# Patient Record
Sex: FEMALE | Race: WHITE | NOT HISPANIC OR LATINO | Employment: FULL TIME | URBAN - METROPOLITAN AREA
[De-identification: names, ages, dates, MRNs, and addresses within clinical notes are randomized per-mention and may not be internally consistent; named-entity substitution may affect disease eponyms.]

---

## 2023-12-12 ENCOUNTER — TELEPHONE (OUTPATIENT)
Dept: ENDOCRINOLOGY | Facility: CLINIC | Age: 33
End: 2023-12-12
Payer: COMMERCIAL

## 2023-12-12 NOTE — TELEPHONE ENCOUNTER
Called patient back. Message was sent to Dr. Martin that this patient would like to proceed with IVF but has VFUV scheduled in February, and to see if there is anything she needs done prior. Patient is aware to call tomorrow to reschedule as an IVF consult as the FUV are only 30 min. Patient was planning diagnostic hysteroscopy over the summer but never called back to schedule.    MEHRDAD SOFIA on 12/12/23 at 4:13 PM.

## 2023-12-12 NOTE — TELEPHONE ENCOUNTER
Patient would like a call back. Took a break following IUI and was told to call back when she's ready to start IVF. I scheduled her for a FUV with Dr. Martin in February but she wanted to know if there's anything she should be doing in the meantime.

## 2023-12-15 ENCOUNTER — TELEPHONE (OUTPATIENT)
Dept: ENDOCRINOLOGY | Facility: CLINIC | Age: 33
End: 2023-12-15
Payer: COMMERCIAL

## 2023-12-15 NOTE — TELEPHONE ENCOUNTER
TC to pt - as pt is not yet scheduled for IVF consult, which is what Dr. Martin recommends. Pt states that she was accidentally scheduled for a virtual FUV instead of IVF consult when she called - pt transferred to [facility] to change the appt.    12/15/23 at 3:32 PM - Jeannette Gandhi RN

## 2024-01-23 NOTE — PROGRESS NOTES
"Patient being seen for annual exam  Patient usually has lower belly pain  Vaginal bleeding after intercourse  Last PAP: 8/17/21 NML HPV -                      Carly Edwards MA    Subjective   Charmayne Chiracu is a 33 y.o. female who is here for a routine exam.  She is sexually active with one male partner and has no concern for STI exposure. She has no pain with sex. She denies bladder or bowel concerns.     Patient reports vaginal odor for the last week. Patient denies any discharge or irritation. Patient reports recently changing laundry detergent.     Patient has the additional concern PCB  as well as light bleeding throughout the month. Patient reports that aub has been occurring for the last two months. Patient also reports that she is under care of [facility] and had had both lab and imaging completed roughly a little over a year ago      Current contraception: none  LMP: 1/9/2024  Last pap: 2021  History of abnormal Pap smear: yes - post leep roughly 6 years ago  Due for pap: yes -   due to hx of LEEP  Family history of uterine or ovarian cancer: no  Family history of prostate cancer: no  Family history of pancreatic cancer: no  Family hx of BRCA1/BRCA2: no  Family history of breast cancer: yes - maternal great grandmother in her 50's  Last mammogram: none  Gardasil: had      OB History    No obstetric history on file.         Review of Systems   Genitourinary:  Positive for menstrual problem.       Objective   /80 (BP Location: Right arm, Patient Position: Sitting, BP Cuff Size: Adult)   Ht 1.6 m (5' 3\")   Wt 54.4 kg (120 lb)   LMP 01/09/2024   BMI 21.26 kg/m²     Physical Exam  HENT:      Mouth/Throat:      Mouth: Mucous membranes are moist.   Eyes:      Conjunctiva/sclera: Conjunctivae normal.   Neck:      Thyroid: No thyroid mass, thyromegaly or thyroid tenderness.   Cardiovascular:      Rate and Rhythm: Normal rate and regular rhythm.      Pulses: Normal pulses.   Pulmonary:      Effort: Pulmonary " effort is normal.      Breath sounds: Normal breath sounds.   Chest:   Breasts:     Breasts are symmetrical.      Right: Normal.      Left: Normal.   Abdominal:      General: Abdomen is flat.      Palpations: Abdomen is soft.      Hernia: There is no hernia in the left inguinal area or right inguinal area.   Genitourinary:     General: Normal vulva.      Vagina: Normal.      Cervix: Friability and eversion present.      Uterus: Normal.       Adnexa: Right adnexa normal and left adnexa normal.      Comments: Pap collected  Musculoskeletal:         General: Normal range of motion.      Cervical back: Normal range of motion.   Lymphadenopathy:      Cervical: No cervical adenopathy.      Right cervical: No superficial, deep or posterior cervical adenopathy.     Left cervical: No superficial, deep or posterior cervical adenopathy.      Lower Body: No right inguinal adenopathy. No left inguinal adenopathy.   Skin:     General: Skin is warm.      Capillary Refill: Capillary refill takes less than 2 seconds.   Neurological:      Mental Status: She is alert and oriented to person, place, and time.   Psychiatric:         Mood and Affect: Mood normal.         Behavior: Behavior normal.          Assessment/Plan   A&P --> 33 y.o. y.o woman for annual GYN exam.     - Health Maintenance -->  - Routine follow up with PCP for health maintenance examination encouraged, including TSH, cholesterol, and Vit. D evaluation.  Self breast awareness encouraged; concerning characteristics of breasts reviewed - Pt. will report general concerns, any adherent lumps, skin dimpling/puckering or color changes, and any nipple discharge.    Diet and exercise reviewed.     Pap will pap today and if wnl next pap will be in 3 years due to hx :- Reviewed ACOG and ASCCP guidelines with pt.        - STD Screening: off pap for gc/ct and trich for PCB     - Contraception Plan: none, seeing infertility, trying to conceive.     -PCB and AUB- pap today, gc/ct  and trich off pap, tsh with reflex today. Will consider pelvic US if other testing is wnl. Ectropion cervix noted today.      - F/U 1 year or as needed.    Kaylie Graf, SANJANA-LENIM

## 2024-01-24 ENCOUNTER — OFFICE VISIT (OUTPATIENT)
Dept: OBSTETRICS AND GYNECOLOGY | Facility: CLINIC | Age: 34
End: 2024-01-24
Payer: COMMERCIAL

## 2024-01-24 VITALS
HEIGHT: 63 IN | WEIGHT: 120 LBS | BODY MASS INDEX: 21.26 KG/M2 | SYSTOLIC BLOOD PRESSURE: 116 MMHG | DIASTOLIC BLOOD PRESSURE: 80 MMHG

## 2024-01-24 DIAGNOSIS — N89.8 VAGINAL ODOR: ICD-10-CM

## 2024-01-24 DIAGNOSIS — Z11.3 SCREENING FOR STDS (SEXUALLY TRANSMITTED DISEASES): ICD-10-CM

## 2024-01-24 DIAGNOSIS — N93.9 ABNORMAL UTERINE BLEEDING (AUB): ICD-10-CM

## 2024-01-24 DIAGNOSIS — Z01.419 WELL WOMAN EXAM: Primary | ICD-10-CM

## 2024-01-24 LAB — TSH SERPL-ACNC: 1.32 MIU/L (ref 0.44–3.98)

## 2024-01-24 PROCEDURE — 36415 COLL VENOUS BLD VENIPUNCTURE: CPT

## 2024-01-24 PROCEDURE — 1036F TOBACCO NON-USER: CPT

## 2024-01-24 PROCEDURE — 87205 SMEAR GRAM STAIN: CPT

## 2024-01-24 PROCEDURE — 88175 CYTOPATH C/V AUTO FLUID REDO: CPT

## 2024-01-24 PROCEDURE — 99213 OFFICE O/P EST LOW 20 MIN: CPT

## 2024-01-24 PROCEDURE — 84443 ASSAY THYROID STIM HORMONE: CPT

## 2024-01-24 PROCEDURE — 87800 DETECT AGNT MULT DNA DIREC: CPT

## 2024-01-24 PROCEDURE — 87661 TRICHOMONAS VAGINALIS AMPLIF: CPT

## 2024-01-24 PROCEDURE — 99395 PREV VISIT EST AGE 18-39: CPT

## 2024-01-24 PROCEDURE — 87624 HPV HI-RISK TYP POOLED RSLT: CPT

## 2024-01-24 RX ORDER — OXCARBAZEPINE 150 MG/1
150 TABLET, FILM COATED ORAL 2 TIMES DAILY
COMMUNITY

## 2024-01-24 RX ORDER — FLUTICASONE PROPIONATE AND SALMETEROL 500; 50 UG/1; UG/1
1 POWDER RESPIRATORY (INHALATION)
COMMUNITY
Start: 2022-01-31

## 2024-01-24 RX ORDER — ALBUTEROL SULFATE 90 UG/1
AEROSOL, METERED RESPIRATORY (INHALATION)
COMMUNITY
Start: 2013-07-10

## 2024-01-24 RX ORDER — DEXTROAMPHETAMINE SACCHARATE, AMPHETAMINE ASPARTATE, DEXTROAMPHETAMINE SULFATE AND AMPHETAMINE SULFATE 5; 5; 5; 5 MG/1; MG/1; MG/1; MG/1
20 TABLET ORAL DAILY
COMMUNITY

## 2024-01-24 RX ORDER — ALBUTEROL SULFATE 0.83 MG/ML
SOLUTION RESPIRATORY (INHALATION)
COMMUNITY

## 2024-01-25 LAB
C TRACH RRNA SPEC QL NAA+PROBE: NEGATIVE
CLUE CELLS VAG LPF-#/AREA: NORMAL /[LPF]
N GONORRHOEA DNA SPEC QL PROBE+SIG AMP: NEGATIVE
NUGENT SCORE: 0
T VAGINALIS RRNA SPEC QL NAA+PROBE: NEGATIVE
YEAST VAG WET PREP-#/AREA: NORMAL

## 2024-02-08 LAB
CYTOLOGY CMNT CVX/VAG CYTO-IMP: NORMAL
HPV HR 12 DNA GENITAL QL NAA+PROBE: NEGATIVE
HPV HR GENOTYPES PNL CVX NAA+PROBE: NEGATIVE
HPV16 DNA SPEC QL NAA+PROBE: NEGATIVE
HPV18 DNA SPEC QL NAA+PROBE: NEGATIVE
LAB AP HPV GENOTYPE QUESTION: YES
LAB AP HPV HR: NORMAL
LAB AP PAP ADDITIONAL TESTS: NORMAL
LABORATORY COMMENT REPORT: NORMAL
LMP START DATE: NORMAL
PATH REPORT.TOTAL CANCER: NORMAL

## 2024-02-22 ENCOUNTER — APPOINTMENT (OUTPATIENT)
Dept: ENDOCRINOLOGY | Facility: CLINIC | Age: 34
End: 2024-02-22
Payer: COMMERCIAL

## 2024-02-22 ENCOUNTER — CONSULT (OUTPATIENT)
Dept: ENDOCRINOLOGY | Facility: CLINIC | Age: 34
End: 2024-02-22
Payer: COMMERCIAL

## 2024-02-22 VITALS
WEIGHT: 123 LBS | SYSTOLIC BLOOD PRESSURE: 118 MMHG | DIASTOLIC BLOOD PRESSURE: 78 MMHG | HEART RATE: 99 BPM | BODY MASS INDEX: 21.79 KG/M2 | TEMPERATURE: 97.5 F | HEIGHT: 63 IN

## 2024-02-22 DIAGNOSIS — N96 RECURRENT PREGNANCY LOSS WITHOUT CURRENT PREGNANCY: Primary | ICD-10-CM

## 2024-02-22 PROCEDURE — 99215 OFFICE O/P EST HI 40 MIN: CPT | Performed by: OBSTETRICS & GYNECOLOGY

## 2024-02-22 PROCEDURE — 99417 PROLNG OP E/M EACH 15 MIN: CPT | Performed by: OBSTETRICS & GYNECOLOGY

## 2024-02-22 RX ORDER — FLUOXETINE HYDROCHLORIDE 40 MG/1
40 CAPSULE ORAL DAILY
COMMUNITY

## 2024-02-22 RX ORDER — DEXTROAMPHETAMINE SACCHARATE, AMPHETAMINE ASPARTATE MONOHYDRATE, DEXTROAMPHETAMINE SULFATE AND AMPHETAMINE SULFATE 5; 5; 5; 5 MG/1; MG/1; MG/1; MG/1
20 CAPSULE, EXTENDED RELEASE ORAL EVERY MORNING
COMMUNITY

## 2024-02-22 ASSESSMENT — PAIN SCALES - GENERAL: PAINLEVEL: 0-NO PAIN

## 2024-02-22 NOTE — PROGRESS NOTES
Virtual Visit    Interval history: Menstruation has gotten worse. Worried about endometriosis. Interested in surgery. Last visit was a year ago.AMH 3.9  in March 2022.   Having issues with asthma, headaches, nausea. They ar ready for IVF>   Past Medical History:   Diagnosis Date    Asthma     Depression with anxiety     Gingiva disorder     History of headache     Migraine, unspecified, not intractable, without status migrainosus     Migraines    Personal history of diseases of the skin and subcutaneous tissue     History of eczema    Personal history of other diseases of the respiratory system     History of asthma    Personal history of other endocrine, nutritional and metabolic disease     History of cystic fibrosis    Personal history of other mental and behavioral disorders     History of anxiety    Seasonal allergies      Past Surgical History:   Procedure Laterality Date    APPENDECTOMY  06/27/2016    Appendectomy    OTHER SURGICAL HISTORY  08/04/2022    Loop electrosurgical excision procedure     Current Outpatient Medications   Medication Instructions    albuterol 2.5 mg /3 mL (0.083 %) nebulizer solution USE 3 ML VIA NEBULIZER 3 TIMES DAILY AS NEEDED FOR WHEEZING/SHORTNESS OF BREATH. INHALE OVER 5 TO 15 MINUTES    albuterol 90 mcg/actuation inhaler INHALE 1 TO 2 PUFFS AS INSTRUCTED EVERY 4 HOURS AS NEEDED FOR WHEEZING/ SHORTNESS OF BREATH    amphetamine-dextroamphetamine (AdderalL) 20 mg tablet 20 mg, oral, Daily    amphetamine-dextroamphetamine XR (Adderall XR) 20 mg 24 hr capsule 20 mg, oral, Every morning, Do not crush or chew.    FLUoxetine (PROZAC) 40 mg, oral, Daily, Patient states she is taking 60mg    fluticasone propion-salmeteroL (Advair Diskus) 500-50 mcg/dose diskus inhaler 1 puff, inhalation    ondansetron HCl (ZOFRAN ORAL) oral    OXcarbazepine (TRILEPTAL) 150 mg, oral, 2 times daily       Past History Reviewed and Affirmed Below:  33yo RPL and infertility      Discussed treatment to date,  pt has completed 3 cycles of ovulation induction with partner IUI; multiple follicles developed each cycle with >5 million TMC at time of IUI.   Discussed treatment options continue ovulation induction with partner IUI vs COH partner IUI vs Invocell vs IVF vs diagnostic laparoscopy. She does have vertility coverage with [employer].   Given suspicion for endometriosis can consider diagnostic laparoscopy, discussed risks/benefits. She is aware there may be no disease found but wants pain relief and a possible diagnosis.      PREOP VISIT AND CONSENT  Reviewed history, labs and imaging. No updates in medical/surgical history.   Discussed planned procedure including risks of blood transfusion, anesthesia complications, injuries including bowel bladder and ureter and possible delayed post op complications requiring additional procedures. Reviewed immediate and delayed post op complications. Reviewed risks of infection and proper incision care. Reviewed time off of work and restrictions.   RBA discussed and consents signed.      Planned procedure: Diagnostic hysteroscopy and laparoscopy, chromopertubation, any indicated procedure     Baylee Martin MD 2023 10:54           Chief Complaint     Patient presents with IVF consult. CGLANTON LPN   PAP 2022 WNL; NO LEANDRA  LMP 4/15/2023;   CGLANTON      History of Present IllnessINT Hx: Here today to review IVF and other options. She has been having very difficult- long, heavy crampy menses. She started with CC 50mg and has changed to letrozole. She has not had luteal issues with progesterone supplementation with IUI. TMC is good. A1c at  was 5.1  She has some concerns about IVF as next step.   She has been told by her genetic counselor that she does not have CF- Sees Dr Ochoa for CF at . Per genetics, no indication for PGTM.      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   Patient of Dr. Martin      Interval history:   Pt has completed 3 cycles of Clomid 50 mg with partner IUI x 1 cycle, without conception   Letrozole 2.5 mg with partner IUI x 2 cycles, without conception      Previous history obtained by Dr. Martin, verified today:   Patient is a 31 year year old  female with a hx CF (rare allele) who presents with RPL and infertility x 3 years. Seen previously at [facility] by Dr Guzman and told it was unexplained. They live in [place]. Results from [facility] reviewed on her phone during consultation. 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   The first day of her menstrual cycle is very heavy with significant dysmenorrhea.   They had been curious about PCOS and was told based on US that she has many \"cysts.\"  She has been timing with OPKs. She tried BBTs. They are concerned they have a LPD as she starts bleeding 2-3 days before her menses. Notes dry skin, very little acne.   She takes digestive enzymes for her CF - BMI is 21.   She has never had a saline US or hysteroscopy, only a " normal HSG at [facility].   She has secondary insurance through [employer].      Previous testing:   AMH: 3.88 (3/21/22)  TSH: WNL multiple times per patient, most recent 1.45 (1/6/22)  FSH: 7.1 (3/14/22)  LH: 3.4 (3/14/22)  PRL: not done  Blood Type: O negative  Testosterone: not done  DHEAS: not done  SA WNL at [facility]  HSG WNL     RELATIONSHIP STATUS:      OB Hx: (year, GA, mode, complications, name) Miscarriage in 9/2021 & 6/2022 (2 total pregnancies), no D&Cs  First SAB- chemical pregnancy, took two years to conceive, 5-6 weeks, started bleeding a few days after her pos home preg test (pos HCG was done while bleeding)  Second SAB- 9 weeks, took them 6 months to conceive, both had food poisoning and towards the end she had bleeding, went to ER and tried to send her tissue to genetics but no results were obtained- gestational sac was seen but no YS or fetal pole, did not require D&C, HCG was 44,000     GYN HISTORY:  History of STI or PID: Yes  Last pap smear: 8/2021 per patient normal  History of abnormal paps: Yes (Leep done 5 years ago)  Mammogram: No  Coitus: x/fertile week   Pelvic pain: With cycles   Pain with intercourse, bowel movements or full bladder: Yes     MENSTRUAL HISTORY:   LMP: Unknown  Menarche: 12  Contraception: Oral b.c., nuvaring, depo  Cycle length: 28-30 days, she notes some spotting or bleeding 2-3 days before onset of menses  Bleeding length: 4 days  Heavy bleeding: Heavy 2 days  Dysmenorrhea: Yes     ENDOCRINE/INFERTILITY HISTORY:  Duration of infertility: 3 yrs  Coital Activity/week: 4-5x  Nipple Discharge: No  Vision changes / headaches: No / Migraines once a week  Excess hair growth: No  Acne/oily skin: Dry skin  Recent weight change: No   Exercise: 2 days a week     PMH: CF (rare variant with one allele), asthma, migraines, anxiety, eczema     MEDICATION: Advair, albuterol, azithromycin, creon, miralax, oxycarbazepine, prenatal tabs, ventolin, VIt D     Allergies: tree nuts, fish/seafood,  levaquin, amoxicillin     PSH: None     PSYCH HISTORY: Anxiety     SOCIAL HISTORY:  Occupation: [employer]   Smoking: No  Alcohol: No  Drug use: No     PARTNER HISTORY:  Name- Calvin Stanford  Age- 31  10/30/1990  Occupation- Sales  Prior fertility history: No  PMH: ADHD  PSH: None  Smoking: No  Alcohol Use: No  Drug Use: No  Medications: Adderall  Injuries /STDs: No  SA: Yes, WNL     FAMILY HISTORY:  Cancer history (breast, ovarian, colon): no  Both of their moms had 1-2 SABs     GENETIC HISTORY:   Ethnic Background:   Patient:    Partner:   Genetic Disease in Family: No  Birth Defects in Family: No  Genetic screening performed previously: Yes, has rare allele for CF and he has not been tested for this. She and he both had Sema4. She was positive for 4 conditions, none of which are the same as his 2 positive conditions (he will get us the copy of his results). HOWEVER, he has not been tested for her allele. They have had a call with the Sema4 counselors in the past.       ASSESSMENT   33 y.o.  female with  secondary infertility x 2 years, RPL, and the following pertinent medical issues: possible endometriosis.  Indication for IVF: Recurrent Pregnancy Loss  Partner SA: Normal    We reviewed IVF and discussed the following:   In-vitro fertilization and embryo transfer  Stimulation protocols   Oocyte retrieval, risks    Cryopreservation   Assessment of fertilization   Embryo development  Statistics  ICSI/Assisted hatching   Embryo transfer and preparation    Risks of OHSS and multiple gestation   Cancelled cycles   Use of birth control   Selective reduction   Number of embryos to transfer   Ectopic pregnancy  and miscarriage  Team based care  Informed consent procedures  Folic acid supplementation   Genetic carrier screening   PGT  Frozen tissue storage and transport process  Discussed that PAP and mammogram must be updated if appropriate based on age and clinical  history and  results received before treatment can begin.    ART Cycle Plan    1. Protocol:  Lead in: OCP  Stimulation protocol: Antagonist with  and menopur 75  Trigger plan: HCG vs Lupron  Pre-retrieval meds: Antibiotics per protocol  Adjuncts: NONE  Notes:  Considering laparoscopy for poss endo between egg retrieval and embryo transfer    2. FET:  Protocol: Programmed  Adjuncts: NONE  Notes:     3. Insemination:  Sperm source: partner  Sperm collection method: Fresh with Frozen Backup  Notes:  ICSI: Yes  # of oocytes to be fertilized: all    4. Transfer:   Number of embryos to replace: 1 PGTA WNL  Stage of embryo transfer: day 5  Trial transfer needed? No    5. Cryopreservation plan  PGT: PGTA   Freeze all? Yes  Oocyte cryopreservation: NA    6. Patient willing to accept blood transfusion: Yes    7. RN to review chart, initiate IVF boarding pass, and assure completion of the following prior to proceeding with IVF stimulation:       No orders of the defined types were placed in this encounter.      STDs (Hepatitis B, Hepatitis C, HIV, Syphilis, GC/CT) for patient and partner (if applicable) to be completed within the last year (z11.3)  Genetic carrier testing: waiver or carrier screen completed with clearance documentation by provider for both patient and partner (z13.71)  Rubella and varicella to be completed within the last five years (z11.59)   TSH to be completed within the last year (z13.29)  Type & Screen to be completed within the last year (z01.83)  AMH to be completed within the last year (z31.41)  Pre-IVF Imaging: Reference any orders placed by provider.  Cavity evaluation: hysteroscopy  Frozen sperm sample: ensure frozen partner sample (z31.41) or verify donor sperm on site prior to stimulation start date.  Verify in EMR or obtain copy of patient’s last mammogram (if applicable) and pap smear results for provider review in boarding pass.  Enroll in Engaged MD and complete annual consent forms for IVF and  cryotransport agreements.  BMI checklist for BMI <18 or >40  Consults: Nursing and Financial Consult.  PAT Consult: No  Ectopic Risk: No  Medically Complex: No  Additional consults NONE and review what is in the boarding pass.    Baylee Martin  02/22/2024  7:23 PM

## 2024-04-05 DIAGNOSIS — Z01.812 ENCOUNTER FOR PREPROCEDURAL LABORATORY EXAMINATION: ICD-10-CM

## 2024-04-05 DIAGNOSIS — Z31.41 FERTILITY TESTING: ICD-10-CM

## 2024-05-16 ENCOUNTER — APPOINTMENT (OUTPATIENT)
Dept: ENDOCRINOLOGY | Facility: CLINIC | Age: 34
End: 2024-05-16
Payer: COMMERCIAL

## 2024-05-16 ENCOUNTER — PREP FOR PROCEDURE (OUTPATIENT)
Dept: ENDOCRINOLOGY | Facility: CLINIC | Age: 34
End: 2024-05-16
Payer: COMMERCIAL

## 2024-05-16 RX ORDER — ACETAMINOPHEN 325 MG/1
650 TABLET ORAL ONCE AS NEEDED
OUTPATIENT
Start: 2024-05-16

## 2024-05-16 RX ORDER — KETOROLAC TROMETHAMINE 30 MG/ML
30 INJECTION, SOLUTION INTRAMUSCULAR; INTRAVENOUS ONCE AS NEEDED
OUTPATIENT
Start: 2024-05-16 | End: 2024-05-21

## 2024-05-23 ENCOUNTER — OFFICE VISIT (OUTPATIENT)
Dept: DERMATOLOGY | Facility: CLINIC | Age: 34
End: 2024-05-23
Payer: COMMERCIAL

## 2024-05-23 DIAGNOSIS — L20.9 ATOPIC DERMATITIS, UNSPECIFIED TYPE: Primary | ICD-10-CM

## 2024-05-23 PROCEDURE — 99204 OFFICE O/P NEW MOD 45 MIN: CPT | Performed by: NURSE PRACTITIONER

## 2024-05-23 PROCEDURE — 1036F TOBACCO NON-USER: CPT | Performed by: NURSE PRACTITIONER

## 2024-05-23 NOTE — PROGRESS NOTES
Subjective     Charmayne Chiracu is a 33 y.o. female who presents for the following: Rash and Eczema.   New patient in for eczema- all over including hands longstanding - eucerin lotion, amlactin.   Has tried: dupixent (stop due to needle phobia), unknown topical steroids. Eucrisa (not effective).     Rash - present x 3 weeks- denies itch. Patient states that the rash is red in color and is located all over.     Review of Systems:  No other skin or systemic complaints other than what is documented elsewhere in the note.    The following portions of the chart were reviewed this encounter and updated as appropriate:       Skin Cancer History  No skin cancer on file.    Specialty Problems    None    Past Medical History:  Charmayne Chiracu  has a past medical history of Asthma ([facility]), Depression with anxiety, Gingiva disorder, History of headache, Migraine, unspecified, not intractable, without status migrainosus, Personal history of diseases of the skin and subcutaneous tissue, Personal history of other diseases of the respiratory system, Personal history of other endocrine, nutritional and metabolic disease, Personal history of other mental and behavioral disorders, and Seasonal allergies.    Past Surgical History:  Charmayne Chiracu  has a past surgical history that includes Appendectomy (06/27/2016) and Other surgical history (08/04/2022).    Family History:  Patient family history is not on file.    Social History:  Charmayne Chiracu  reports that she has never smoked. She has never been exposed to tobacco smoke. She has never used smokeless tobacco. She reports current alcohol use. She reports that she does not use drugs.    Allergies:  Shellfish containing products, Tree nuts, Latex, Levofloxacin, and Amoxicillin    Current Medications / CAM's:    Current Outpatient Medications:     albuterol 2.5 mg /3 mL (0.083 %) nebulizer solution, USE 3 ML VIA NEBULIZER 3 TIMES DAILY AS NEEDED FOR WHEEZING/SHORTNESS OF  BREATH. INHALE OVER 5 TO 15 MINUTES, Disp: , Rfl:     albuterol 90 mcg/actuation inhaler, INHALE 1 TO 2 PUFFS AS INSTRUCTED EVERY 4 HOURS AS NEEDED FOR WHEEZING/ SHORTNESS OF BREATH, Disp: , Rfl:     amphetamine-dextroamphetamine (AdderalL) 20 mg tablet, Take 1 tablet (20 mg) by mouth once daily., Disp: , Rfl:     amphetamine-dextroamphetamine XR (Adderall XR) 20 mg 24 hr capsule, Take 1 capsule (20 mg) by mouth once daily in the morning. Do not crush or chew., Disp: , Rfl:     FLUoxetine (PROzac) 40 mg capsule, Take 1 capsule (40 mg) by mouth once daily. Patient states she is taking 60mg, Disp: , Rfl:     fluticasone propion-salmeteroL (Advair Diskus) 500-50 mcg/dose diskus inhaler, Inhale 1 puff., Disp: , Rfl:     ondansetron HCl (ZOFRAN ORAL), Take by mouth., Disp: , Rfl:     OXcarbazepine (Trileptal) 150 mg tablet, Take 1 tablet (150 mg) by mouth 2 times a day., Disp: , Rfl:     ruxolitinib (Opzelura) 1.5 % cream cream, Apply 1 Application topically 2 times a day., Disp: 60 g, Rfl: 3     Objective   Well appearing patient in no apparent distress; mood and affect are within normal limits.      Assessment/Plan   1. Atopic dermatitis, unspecified type  Erythematous scaly papules and plaques with overyling excoriation located symmetrically in the antecubital and popliteal fossae.  She has scattered small pink scaly macules and papules scattered on her body throughout.  Patient has attempted to use multiple topical steroids, oral antihistamines oral corticosteroids and Dupixent in the past.  Dupixent was discontinued because patient had a difficult time administering the injection herself while she did find it somewhat helpful.  Patient uses gentle cleansers and daily moisturizers and is aware to avoid excessive fragrance    Plan: Counseling.  I counseled the patient regarding the following:  Skin care: Patient should bathe using lukewarm water with a mild cleanser and moisturize immediately after. Emollients should  be applied at least  2-3 times daily. Avoid scented detergents or fabric softeners. Keep fingernai ls short. Avoid excessive hand washing.  Expectations : The patient is aware that eczema is chronic in nature and can improve with moisturizers and topical steroids and worsen with stress,  scented soaps, detergents, scratching, dry skin, changes in weather and skin infections.  Contact office if:  Eczema worsens or fails to improve despite several weeks of treatment; patient develops skin infections (such as:yellow honey  colored crusts or cold sores).    PLAN:  Start Opzelura 1.5% cream twice daily for 8 weeks and reevaluate  We discussed the use of oral Aayush inhibitors as the potential  And treatment as patient states the wintertime unbearable however she is beginning IVF therapy for infertility in the fall 2024.  Will plan to avoid systemic treatment if possible in the meantime    ruxolitinib (Opzelura) 1.5 % cream cream  Apply 1 Application topically 2 times a day.

## 2024-05-24 ENCOUNTER — SPECIALTY PHARMACY (OUTPATIENT)
Dept: PHARMACY | Facility: CLINIC | Age: 34
End: 2024-05-24

## 2024-06-07 DIAGNOSIS — L20.9 ATOPIC DERMATITIS, UNSPECIFIED TYPE: ICD-10-CM

## 2024-06-17 ENCOUNTER — HOSPITAL ENCOUNTER (OUTPATIENT)
Dept: ENDOCRINOLOGY | Facility: CLINIC | Age: 34
Discharge: HOME | End: 2024-06-17
Payer: COMMERCIAL

## 2024-06-17 ENCOUNTER — PREP FOR PROCEDURE (OUTPATIENT)
Dept: ENDOCRINOLOGY | Facility: CLINIC | Age: 34
End: 2024-06-17

## 2024-06-17 VITALS
TEMPERATURE: 97.7 F | OXYGEN SATURATION: 97 % | HEART RATE: 81 BPM | SYSTOLIC BLOOD PRESSURE: 113 MMHG | DIASTOLIC BLOOD PRESSURE: 74 MMHG | RESPIRATION RATE: 18 BRPM

## 2024-06-17 DIAGNOSIS — Z11.59 ENCOUNTER FOR SCREENING FOR OTHER VIRAL DISEASES: ICD-10-CM

## 2024-06-17 DIAGNOSIS — Z01.83 ENCOUNTER FOR RH BLOOD TYPING: ICD-10-CM

## 2024-06-17 DIAGNOSIS — Z11.3 SCREENING FOR STDS (SEXUALLY TRANSMITTED DISEASES): ICD-10-CM

## 2024-06-17 DIAGNOSIS — Z31.41 FERTILITY TESTING: ICD-10-CM

## 2024-06-17 LAB
ABO GROUP (TYPE) IN BLOOD: NORMAL
ANTIBODY SCREEN: NORMAL
PREGNANCY TEST URINE, POC: NEGATIVE
RH FACTOR (ANTIGEN D): NORMAL

## 2024-06-17 PROCEDURE — 86850 RBC ANTIBODY SCREEN: CPT

## 2024-06-17 PROCEDURE — 87389 HIV-1 AG W/HIV-1&-2 AB AG IA: CPT

## 2024-06-17 PROCEDURE — 7100000009 HC PHASE TWO TIME - INITIAL BASE CHARGE

## 2024-06-17 PROCEDURE — 58555 HYSTEROSCOPY DX SEP PROC: CPT | Mod: GC | Performed by: OBSTETRICS & GYNECOLOGY

## 2024-06-17 PROCEDURE — 86900 BLOOD TYPING SEROLOGIC ABO: CPT

## 2024-06-17 PROCEDURE — 87340 HEPATITIS B SURFACE AG IA: CPT

## 2024-06-17 PROCEDURE — 58555 HYSTEROSCOPY DX SEP PROC: CPT | Performed by: OBSTETRICS & GYNECOLOGY

## 2024-06-17 PROCEDURE — 83516 IMMUNOASSAY NONANTIBODY: CPT

## 2024-06-17 PROCEDURE — 64435 NJX AA&/STRD PARACRV NRV: CPT | Mod: GC | Performed by: OBSTETRICS & GYNECOLOGY

## 2024-06-17 PROCEDURE — 86317 IMMUNOASSAY INFECTIOUS AGENT: CPT

## 2024-06-17 PROCEDURE — 86803 HEPATITIS C AB TEST: CPT

## 2024-06-17 PROCEDURE — 36415 COLL VENOUS BLD VENIPUNCTURE: CPT

## 2024-06-17 PROCEDURE — 86780 TREPONEMA PALLIDUM: CPT

## 2024-06-17 PROCEDURE — 86901 BLOOD TYPING SEROLOGIC RH(D): CPT

## 2024-06-17 PROCEDURE — 81025 URINE PREGNANCY TEST: CPT | Performed by: STUDENT IN AN ORGANIZED HEALTH CARE EDUCATION/TRAINING PROGRAM

## 2024-06-17 RX ORDER — KETOROLAC TROMETHAMINE 30 MG/ML
30 INJECTION, SOLUTION INTRAMUSCULAR; INTRAVENOUS ONCE AS NEEDED
Status: DISCONTINUED | OUTPATIENT
Start: 2024-06-17 | End: 2024-06-18 | Stop reason: HOSPADM

## 2024-06-17 RX ORDER — ACETAMINOPHEN 325 MG/1
650 TABLET ORAL ONCE AS NEEDED
Status: DISCONTINUED | OUTPATIENT
Start: 2024-06-17 | End: 2024-06-18 | Stop reason: HOSPADM

## 2024-06-17 ASSESSMENT — PAIN - FUNCTIONAL ASSESSMENT
PAIN_FUNCTIONAL_ASSESSMENT: 0-10
PAIN_FUNCTIONAL_ASSESSMENT: 0-10

## 2024-06-17 ASSESSMENT — PAIN SCALES - GENERAL
PAINLEVEL_OUTOF10: 0 - NO PAIN
PAINLEVEL_OUTOF10: 0 - NO PAIN

## 2024-06-17 NOTE — PROGRESS NOTES
Patient ID: Charmayne Chiracu is a 33 y.o. female.    Hysteroscopy diagnostic    Date/Time: 6/17/2024 2:14 PM    Performed by: Shelby Jean-Baptiste MD  Authorized by: Baylee Martin MD    Consent:     Consent obtained:  Verbal and written    Consent given by:  Patient    Risks, benefits, and alternatives were discussed: yes      Risks discussed:  Bleeding, infection and pain  Universal protocol:     Procedure explained and questions answered to patient or proxy's satisfaction: yes      Relevant documents present and verified: yes      Test results available: yes      Imaging studies available: yes      Required blood products, implants, devices, and special equipment available: yes      Immediately prior to procedure, a time out was called: yes      Patient identity confirmed:  Verbally with patient, arm band and hospital-assigned identification number  Pre-procedure details:     Skin preparation:  Povidone-iodine  Procedure specific details:      Procedure: Diagnostic Hysteroscopy   Preop diagnosis: fertility testing  Post op diagnosis: Same   Assistant: MD Jerilyn    Anesthesia: None   IV: None   EBL: 3 cc  Specimens: None   Complications: None   Risks benefits and alternatives of the procedure explained to the patient and informed consent was obtained. Urine pregnancy test was performed and was negative. Time out was performed. The patient was placed in the dorsal lithotomy position and a sterile speculum was placed in the vagina. The cervix was sterilized with Betadine x3. Paracervical block with lidocaine 1% was administered.   Tenaculum: Yes  Dilation: No  The hysteroscope was placed in the cervix and advanced into the uterine cavity. Normal saline was used for distension media. Images were obtained and findings noted as below.   All instruments were then removed. Good hemostasis was noted. Patient tolerated the procedure well returned to the recovery area in stable condition.   Findings:   Cavity: Smooth cavity  no lesions noted  Ostia: Bilateral tubal ostia visualized   Additional Notes:    Attending Attestation: I was physically present for key and critical portions performed by the fellow. I reviewed the fellow's documentation and discussed the patient with the fellow. I agree with the fellow's medical decision making as documented in the fellow's note.   Shelby Jean-Baptiste 06/17/24 2:14 PM                Post-procedure details:     Procedure completion:  Tolerated well, no immediate complications      Baylee Martin 06/18/24 10:55 AM

## 2024-06-18 DIAGNOSIS — L20.9 ATOPIC DERMATITIS, UNSPECIFIED TYPE: Primary | ICD-10-CM

## 2024-06-18 LAB
HBV SURFACE AG SERPL QL IA: NONREACTIVE
HCV AB SER QL: NONREACTIVE
HIV 1+2 AB+HIV1 P24 AG SERPL QL IA: NONREACTIVE
RUBV IGG SERPL IA-ACNC: 1.4 IA
RUBV IGG SERPL QL IA: POSITIVE
TREPONEMA PALLIDUM IGG+IGM AB [PRESENCE] IN SERUM OR PLASMA BY IMMUNOASSAY: NONREACTIVE

## 2024-06-18 RX ORDER — PIMECROLIMUS 10 MG/G
CREAM TOPICAL 2 TIMES DAILY
Qty: 60 G | Refills: 2 | Status: SHIPPED | OUTPATIENT
Start: 2024-06-18 | End: 2025-06-18

## 2024-06-19 LAB — MIS SERPL-MCNC: 5.77 NG/ML (ref 0.18–11.71)

## 2024-07-31 ENCOUNTER — APPOINTMENT (OUTPATIENT)
Dept: DERMATOLOGY | Facility: CLINIC | Age: 34
End: 2024-07-31
Payer: COMMERCIAL

## 2024-07-31 DIAGNOSIS — B35.1 ONYCHOMYCOSIS: ICD-10-CM

## 2024-07-31 DIAGNOSIS — L20.9 ATOPIC DERMATITIS, UNSPECIFIED TYPE: Primary | ICD-10-CM

## 2024-07-31 PROCEDURE — 99214 OFFICE O/P EST MOD 30 MIN: CPT | Performed by: NURSE PRACTITIONER

## 2024-07-31 RX ORDER — LISDEXAMFETAMINE DIMESYLATE 40 MG/1
1 CAPSULE ORAL
COMMUNITY

## 2024-07-31 RX ORDER — TERBINAFINE HYDROCHLORIDE 250 MG/1
TABLET ORAL
Qty: 14 TABLET | Refills: 2 | Status: SHIPPED | OUTPATIENT
Start: 2024-07-31

## 2024-07-31 NOTE — PROGRESS NOTES
Subjective     Charmayne Chiracu is a 33 y.o. female who presents for the following: Eczema (Located hands and behind knees/+ Seasonal allergies/Using Eucerin Lotion/Insurance kept denying Opzelura and has not received this yet).     Review of Systems:  No other skin or systemic complaints other than what is documented elsewhere in the note.    The following portions of the chart were reviewed this encounter and updated as appropriate:          Skin Cancer History  No skin cancer on file.      Specialty Problems    None       Objective   Well appearing patient in no apparent distress; mood and affect are within normal limits.    A focused skin examination was performed. All findings within normal limits unless otherwise noted below.    Assessment/Plan   1. Atopic dermatitis, unspecified type  Erythematous scaly papules and plaques with overyling excoriation located symmetrically in the antecubital and popliteal fossae.  She has scattered small pink scaly macules and papules scattered on her body throughout with worsening to bilateral hands.  Patient has attempted to use multiple topical steroids, oral antihistamines oral corticosteroids in the past.  Dupixent attempted in the past but was discontinued because patient had a difficult time administering the injection herself while she did find it somewhat helpful.  Patient uses gentle cleansers and daily moisturizers and is aware to avoid excessive fragrance. On exam today, her BSA estimated at 20% given the scattered plaques.      Plan: Counseling.  I counseled the patient regarding the following:  Skin care: Patient should bathe using lukewarm water with a mild cleanser and moisturize immediately after. Emollients should be applied at least  2-3 times daily. Avoid scented detergents or fabric softeners. Keep fingernai ls short. Avoid excessive hand washing.  Expectations : The patient is aware that eczema is chronic in nature and can improve with moisturizers and  topical steroids and worsen with stress,  scented soaps, detergents, scratching, dry skin, changes in weather and skin infections.  Contact office if:  Eczema worsens or fails to improve despite several weeks of treatment; patient develops skin infections (such as:yellow honey  colored crusts or cold sores).    PLAN:  Opzelura 1.5% cream twice daily was not covered.   Patient states that this is extremely impactful to her daily life and mental estefani and needs relief as she has had atopic dermatitis since 1 years old.    She is planning on a family in the future and is in discussion for possible IVF, however she states she simply can't function with her hands itching or scaling any longer and wants to proceed with systemic treatment given failure of oral prednisone, topical steroids (multiple), and topical elidel.      dupilumab (Dupixent) 300 mg/2 mL pen injector  Inject 4 mL (600 mg) under the skin 1 time for 1 dose.    dupilumab (Dupixent) 300 mg/2 mL pen injector  Inject 2 mL (300 mg) under the skin every 14 (fourteen) days.    Related Medications  pimecrolimus (Elidel) 1 % cream  Apply topically 2 times a day.    2. Onychomycosis (2)  Left 4th Proximal Nail fold of Toe; Left 5th Proximal Nail fold of Toe    Related Medications  terbinafine (LamISIL) 250 mg tablet  Take two tablets daily for 7 days a month for three months

## 2024-08-20 DIAGNOSIS — L20.9 ATOPIC DERMATITIS, UNSPECIFIED TYPE: ICD-10-CM

## 2024-08-20 RX ORDER — DUPILUMAB 300 MG/2ML
INJECTION, SOLUTION SUBCUTANEOUS
Qty: 4 ML | Refills: 0 | Status: SHIPPED | OUTPATIENT
Start: 2024-08-20

## 2024-08-20 NOTE — PROGRESS NOTES
Dupixent prior authorization approved until 2/15/2025. Patient must fill with [facility].     Clinical pharmacist routed Dupixent prescriptions to [facility] per insurance mandate.

## 2024-09-03 ENCOUNTER — APPOINTMENT (OUTPATIENT)
Dept: ALLERGY | Facility: CLINIC | Age: 34
End: 2024-09-03
Payer: COMMERCIAL

## 2024-09-03 VITALS
SYSTOLIC BLOOD PRESSURE: 112 MMHG | TEMPERATURE: 98.3 F | RESPIRATION RATE: 17 BRPM | WEIGHT: 123 LBS | DIASTOLIC BLOOD PRESSURE: 70 MMHG | BODY MASS INDEX: 21.79 KG/M2 | OXYGEN SATURATION: 99 % | HEIGHT: 63 IN | HEART RATE: 90 BPM

## 2024-09-03 DIAGNOSIS — J30.1 SEASONAL ALLERGIC RHINITIS DUE TO POLLEN: ICD-10-CM

## 2024-09-03 DIAGNOSIS — B99.9 RECURRENT INFECTIONS: ICD-10-CM

## 2024-09-03 DIAGNOSIS — R23.8 HYPERLINEAR PALMS: ICD-10-CM

## 2024-09-03 DIAGNOSIS — L74.4 HYPOHIDROSIS: Primary | ICD-10-CM

## 2024-09-03 DIAGNOSIS — J45.52 SEVERE PERSISTENT ASTHMA WITH STATUS ASTHMATICUS (MULTI): ICD-10-CM

## 2024-09-03 DIAGNOSIS — J45.909 ASTHMA, UNSPECIFIED ASTHMA SEVERITY, UNSPECIFIED WHETHER COMPLICATED, UNSPECIFIED WHETHER PERSISTENT (HHS-HCC): ICD-10-CM

## 2024-09-03 DIAGNOSIS — E88.01 HETEROZYGOUS ALPHA 1-ANTITRYPSIN DEFICIENCY (MULTI): ICD-10-CM

## 2024-09-03 DIAGNOSIS — L20.9 ATOPIC DERMATITIS, UNSPECIFIED TYPE: ICD-10-CM

## 2024-09-03 RX ORDER — LEVOCETIRIZINE DIHYDROCHLORIDE 5 MG/1
TABLET, FILM COATED ORAL EVERY EVENING
COMMUNITY

## 2024-09-03 RX ORDER — TRIAMCINOLONE ACETONIDE 1 MG/G
CREAM TOPICAL
COMMUNITY

## 2024-09-03 RX ORDER — ALBUTEROL SULFATE 0.83 MG/ML
2.5 SOLUTION RESPIRATORY (INHALATION) ONCE
Status: COMPLETED | OUTPATIENT
Start: 2024-09-03 | End: 2024-09-03

## 2024-09-03 NOTE — PROGRESS NOTES
Patient ID: Charmayne Chiracu is a 33 y.o. female.     Chief Complaint: NPV self referred. Dr. [place] in [place].  History Of Present Illness  Charmayne Chiracu is a 33 y.o. female with Pmx atopy presenting for consultation.     Sees [facility] in derm.    Food Allergy  Seafood, fish, peaches and tree nuts.    Tree nut and shellfish is anaphylaxis  Peaches-not sure  Fish-itching and tingling  Coconut-rash and mouth feels irritated    Eczema/ Atopic Dermatitis  Atopic dermatitis since child core[place].  Just started loading dose 1/2 wks ago  Previously failed topical steroids, oral prednisone (2 times a year), Opzelura, Elidel.  Patient did try the Dupixent syringe a couple years ago, but did not want to do the syringe. Now that it is the pen, she is ok with doing.    Asthma    Age at diagnosis: 1 yo  Did allergy shots and Xolair as a child  Current medication: Advair bid and albuterol as needed. Has tried Singulair and it failed.  Hospitalizations/ER visits in the last year: Urgent care visit 4-5 months ago for hives.No er for breathing issues  Oral steroid/systemic steroids in the last year: 2 oral steroids in the last year for difficulty breathing  Triggers: change of seasons. Rigorous activity, illness  Smoker or Smoke exposure: never smoked. Mom smoked outside when she was a kid.  Family history:  Both parents have allergy, dad has asthma/copd.  Her father and paternal side of family with bad eczema.    Patient was previously told she had a rare variant of CF, but when recently retested by genetics, she did not have the gene.  She reports she does not sweat normally so her sweat tests were insufficient.  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  She had a lot of dental work as a child and was told enamel issue due to recurrent need for oral steroids.  Has not had issues with her hair.  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  Takes Xyzal at bedtime.  For Congestion she uses Allegra D in the morning.  No nasal sprays at this time. Nasal polyps are bad and the sprays did not help. Has not tried Xhance.  [facility]     Drug Allergy   No allergy to ASA    Insect Allergy   No    Infections  No history of frequent or recurrent infections      Review of Systems    Pertinent positives and negatives have been assessed in the HPI. All other systems have been reviewed and are negative except as noted in the HPI.    Allergies  Shellfish containing products, Tree nuts, Latex, Levofloxacin, and Amoxicillin    Past Medical History  She has a past medical history of Asthma ([facility]), Depression with anxiety, Gingiva disorder, History of headache, Migraine, unspecified, not intractable, without status migrainosus, Personal history of diseases of the skin and subcutaneous tissue, Personal history of other diseases of the respiratory system, Personal history of other endocrine, nutritional and metabolic disease, Personal history of other mental and behavioral disorders, and Seasonal allergies.  Patient has been undergoing fertility treatments in effort to become pregnant.  Family History  As noted in history above    Surgical History  She has a past surgical history that includes Appendectomy (06/27/2016) and Other surgical history (08/04/2022).    Social/Environmental History  She reports that she has never smoked. She has never been exposed to tobacco smoke. She has never used smokeless tobacco. She reports current alcohol use. She reports that she does not use drugs.    Home: Lives in a house   Floors: Wood, some new carpet  Air Conditioning: Central  Smoker: No  Pets: 2 alexis doodles  Infestations: No  Molds: No  Occupation: works in sales for [employer].    MEDICATIONS  Current Outpatient Medications on File Prior  "to Visit   Medication Sig Dispense Refill    albuterol 2.5 mg /3 mL (0.083 %) nebulizer solution USE 3 ML VIA NEBULIZER 3 TIMES DAILY AS NEEDED FOR WHEEZING/SHORTNESS OF BREATH. INHALE OVER 5 TO 15 MINUTES      albuterol 90 mcg/actuation inhaler INHALE 1 TO 2 PUFFS AS INSTRUCTED EVERY 4 HOURS AS NEEDED FOR WHEEZING/ SHORTNESS OF BREATH      amphetamine-dextroamphetamine XR (Adderall XR) 20 mg 24 hr capsule Take 1 capsule (20 mg) by mouth once daily in the morning. Do not crush or chew.      dupilumab (Dupixent Pen) 300 mg/2 mL pen injector Inject 600mg on day 1 for loading dose 4 mL 0    dupilumab (Dupixent) 300 mg/2 mL pen injector Inject 2 mL (300 mg) under the skin every 14 (fourteen) days. 4 mL 3    FLUoxetine (PROzac) 40 mg capsule Take 1 capsule (40 mg) by mouth once daily. Patient states she is taking 60mg      fluticasone propion-salmeteroL (Advair Diskus) 500-50 mcg/dose diskus inhaler Inhale 1 puff.      lisdexamfetamine (Vyvanse) 40 mg capsule Take 1 capsule (40 mg) by mouth once daily in the morning. Take before meals.      ondansetron HCl (ZOFRAN ORAL) Take by mouth.      OXcarbazepine (Trileptal) 150 mg tablet Take 1 tablet (150 mg) by mouth 2 times a day.      pimecrolimus (Elidel) 1 % cream Apply topically 2 times a day. 60 g 2    terbinafine (LamISIL) 250 mg tablet Take two tablets daily for 7 days a month for three months 14 tablet 2    triamcinolone (Kenalog) 0.1 % cream TAKE 1 OSWALDO APPLIED TOPICALLY 2 TIMES A DAY APPLY TO AFFECTED AREA TWICE DAILY       No current facility-administered medications on file prior to visit.         Physical Exam  Visit Vitals  /70   Pulse 90   Temp 36.8 °C (98.3 °F) (Temporal)   Resp 17   Ht 1.6 m (5' 3\")   Wt 55.8 kg (123 lb)   SpO2 99%   BMI 21.79 kg/m²   OB Status Having periods   Smoking Status Never   BSA 1.57 m²       Wt Readings from Last 1 Encounters:   09/03/24 55.8 kg (123 lb)       Physical Exam    General: Well appearing, no acute distress  Head: " Normocephalic, atraumatic, neck supple without lymphadenopathy  Eyes: EOMI, non-injected  Nose: No nasal crease, nares patent, slightly boggy turbinates, minimal discharge  Throat: Normal dentition, no erythema  Heart: Regular rate and rhythm  Lungs: Clear to auscultation bilaterally, effort normal  Abdomen: Soft, non-tender, normal bowel sounds. No hsm  Extremities: Moves all extremities symmetrically, no edema  Skin: Fair skin/dry. Hyperlinearity of the palms. Neck with eczematous patches, forearms and hands with erythematous eczematous patches.  Antecubes bilaterally with erythema and dry eczematous patches. No open scratches or oozing is present. Lesions on the legs have recently cleared per patient.  Psych: normal mood and affect    LAB RESULTS:  CBC:  Recent Labs     06/13/22  1326   WBC 8.8   HGB 14.9   HCT 43.3      MCV 93   EOSABS 0.09       CMP:  Recent Labs     06/13/22  1326   *   K 3.2*      CO2 25   ANIONGAP 11   BUN 18   CREATININE 0.85     Recent Labs     06/13/22  1326   EOSABS 0.09       ABO:   Recent Labs     06/17/24  1359   ABO O       HEME/ENDO:  Recent Labs     01/24/24  1448 08/29/22  1007 01/06/22  1612   TSH 1.32 1.08  --    HGBA1C  --   --  5.1     Pre/post albuterol spirometry is performed. She is on daily Advair maintenance therapy and did take this morning.  Assessment/Plan   Charmayne is a 32 yo woman with history of multiple atopic conditions including rhinitis, food allergy, atopic dermatitis and severe asthma. She has normal lung function on therapy today. She reports a history of recurrent infections, hypohidrosis and recurrent infections. She is a heterozygote for Alpha 1 antitrypsin.  She has struggled with infertility issues. There is a family history of copd on her father's side of the family.  Patient and I discussed history as concerning for a more unifying diagnosis such as and ectodermal dysplasia variant.  I will refer to genetics for additional  input.  She will continue with Dupixent as she has had only one loading dose and wishes to improve her skin.  She reports a possible side effect of fatigue, but will continue to monitor.  I will obtain immune and allergy labs. I will defer allergy skin tests as I do not want her to be itching and to flare her skin as she attempts to gain control on biologic therapy.  Will plan follow up in a month to review labs and plan of care.    Nalini Pierce, DO

## 2024-10-08 ENCOUNTER — APPOINTMENT (OUTPATIENT)
Dept: ALLERGY | Facility: CLINIC | Age: 34
End: 2024-10-08
Payer: COMMERCIAL

## 2024-11-01 ENCOUNTER — LAB (OUTPATIENT)
Dept: LAB | Facility: LAB | Age: 34
End: 2024-11-01
Payer: COMMERCIAL

## 2024-11-01 DIAGNOSIS — B99.9 RECURRENT INFECTIONS: ICD-10-CM

## 2024-11-01 DIAGNOSIS — J30.1 SEASONAL ALLERGIC RHINITIS DUE TO POLLEN: ICD-10-CM

## 2024-11-01 DIAGNOSIS — J45.52 SEVERE PERSISTENT ASTHMA WITH STATUS ASTHMATICUS (MULTI): ICD-10-CM

## 2024-11-01 LAB
ALBUMIN SERPL BCP-MCNC: 4.4 G/DL (ref 3.4–5)
ALP SERPL-CCNC: 63 U/L (ref 33–110)
ALT SERPL W P-5'-P-CCNC: 11 U/L (ref 7–45)
ANION GAP SERPL CALC-SCNC: 8 MMOL/L (ref 10–20)
AST SERPL W P-5'-P-CCNC: 18 U/L (ref 9–39)
BASOPHILS # BLD AUTO: 0.02 X10*3/UL (ref 0–0.1)
BASOPHILS NFR BLD AUTO: 0.2 %
BILIRUB SERPL-MCNC: 0.4 MG/DL (ref 0–1.2)
BUN SERPL-MCNC: 15 MG/DL (ref 6–23)
CALCIUM SERPL-MCNC: 9.2 MG/DL (ref 8.6–10.3)
CHLORIDE SERPL-SCNC: 103 MMOL/L (ref 98–107)
CO2 SERPL-SCNC: 30 MMOL/L (ref 21–32)
CREAT SERPL-MCNC: 0.67 MG/DL (ref 0.5–1.05)
EGFRCR SERPLBLD CKD-EPI 2021: >90 ML/MIN/1.73M*2
EOSINOPHIL # BLD AUTO: 1.68 X10*3/UL (ref 0–0.7)
EOSINOPHIL NFR BLD AUTO: 19.4 %
ERYTHROCYTE [DISTWIDTH] IN BLOOD BY AUTOMATED COUNT: 11.9 % (ref 11.5–14.5)
GLUCOSE SERPL-MCNC: 85 MG/DL (ref 74–99)
HCT VFR BLD AUTO: 39.4 % (ref 36–46)
HGB BLD-MCNC: 13.3 G/DL (ref 12–16)
IMM GRANULOCYTES # BLD AUTO: 0.02 X10*3/UL (ref 0–0.7)
IMM GRANULOCYTES NFR BLD AUTO: 0.2 % (ref 0–0.9)
LYMPHOCYTES # BLD AUTO: 2 X10*3/UL (ref 1.2–4.8)
LYMPHOCYTES NFR BLD AUTO: 23.1 %
MCH RBC QN AUTO: 31.7 PG (ref 26–34)
MCHC RBC AUTO-ENTMCNC: 33.8 G/DL (ref 32–36)
MCV RBC AUTO: 94 FL (ref 80–100)
MONOCYTES # BLD AUTO: 0.64 X10*3/UL (ref 0.1–1)
MONOCYTES NFR BLD AUTO: 7.4 %
NEUTROPHILS # BLD AUTO: 4.28 X10*3/UL (ref 1.2–7.7)
NEUTROPHILS NFR BLD AUTO: 49.7 %
NRBC BLD-RTO: 0 /100 WBCS (ref 0–0)
PLATELET # BLD AUTO: 360 X10*3/UL (ref 150–450)
POTASSIUM SERPL-SCNC: 4.2 MMOL/L (ref 3.5–5.3)
PROT SERPL-MCNC: 7 G/DL (ref 6.4–8.2)
RBC # BLD AUTO: 4.2 X10*6/UL (ref 4–5.2)
SODIUM SERPL-SCNC: 137 MMOL/L (ref 136–145)
WBC # BLD AUTO: 8.6 X10*3/UL (ref 4.4–11.3)

## 2024-11-01 PROCEDURE — 85025 COMPLETE CBC W/AUTO DIFF WBC: CPT

## 2024-11-01 PROCEDURE — 82103 ALPHA-1-ANTITRYPSIN TOTAL: CPT

## 2024-11-01 PROCEDURE — 82784 ASSAY IGA/IGD/IGG/IGM EACH: CPT

## 2024-11-01 PROCEDURE — 80053 COMPREHEN METABOLIC PANEL: CPT

## 2024-11-02 LAB
A1AT SERPL NEPH-MCNC: 122 MG/DL (ref 84–218)
IGA SERPL-MCNC: 126 MG/DL (ref 70–400)
IGG SERPL-MCNC: 1270 MG/DL (ref 700–1600)
IGM SERPL-MCNC: 146 MG/DL (ref 40–230)

## 2024-11-04 LAB
C DIPHTHERIAE IGG SER-ACNC: 0 IU/ML
C TETANI TOXOID IGG SERPL IA-ACNC: 1.1 IU/ML
CH50 SERPL-ACNC: 69.1 U/ML (ref 38.7–89.9)

## 2024-11-05 ENCOUNTER — APPOINTMENT (OUTPATIENT)
Dept: ALLERGY | Facility: CLINIC | Age: 34
End: 2024-11-05
Payer: COMMERCIAL

## 2024-11-05 VITALS
BODY MASS INDEX: 21.79 KG/M2 | DIASTOLIC BLOOD PRESSURE: 68 MMHG | SYSTOLIC BLOOD PRESSURE: 98 MMHG | OXYGEN SATURATION: 99 % | WEIGHT: 123 LBS | HEIGHT: 63 IN | TEMPERATURE: 98 F | HEART RATE: 110 BPM | RESPIRATION RATE: 17 BRPM

## 2024-11-05 DIAGNOSIS — J45.50 SEVERE PERSISTENT ASTHMA WITHOUT COMPLICATION (MULTI): ICD-10-CM

## 2024-11-05 DIAGNOSIS — J30.1 SEASONAL ALLERGIC RHINITIS DUE TO POLLEN: ICD-10-CM

## 2024-11-05 DIAGNOSIS — D72.10 EOSINOPHILIA, UNSPECIFIED TYPE: ICD-10-CM

## 2024-11-05 DIAGNOSIS — L20.89 OTHER ATOPIC DERMATITIS: Primary | ICD-10-CM

## 2024-11-05 DIAGNOSIS — Z91.018 FOOD ALLERGY: ICD-10-CM

## 2024-11-05 LAB
S PN DA SERO 19F IGG SER-MCNC: 10.94 UG/ML
S PNEUM DA 1 IGG SER-MCNC: 5.08 UG/ML
S PNEUM DA 10A IGG SER-MCNC: 1.76 UG/ML
S PNEUM DA 11A IGG SER-MCNC: >3.45 UG/ML
S PNEUM DA 12F IGG SER-MCNC: 1.39 UG/ML
S PNEUM DA 14 IGG SER-MCNC: 5.23 UG/ML
S PNEUM DA 15B IGG SER-MCNC: 1.91 UG/ML
S PNEUM DA 17F IGG SER-MCNC: 1.09 UG/ML
S PNEUM DA 18C IGG SER-MCNC: 1.29 UG/ML
S PNEUM DA 19A IGG SER-MCNC: 3.88 UG/ML
S PNEUM DA 2 IGG SER-MCNC: 1.64 UG/ML
S PNEUM DA 20A IGG SER-MCNC: 0.23 UG/ML
S PNEUM DA 22F IGG SER-MCNC: 0.47 UG/ML
S PNEUM DA 23F IGG SER-MCNC: 0.51 UG/ML
S PNEUM DA 3 IGG SER-MCNC: 1.57 UG/ML
S PNEUM DA 33F IGG SER-MCNC: 0.92 UG/ML
S PNEUM DA 4 IGG SER-MCNC: 2.39 UG/ML
S PNEUM DA 5 IGG SER-MCNC: 0.68 UG/ML
S PNEUM DA 6B IGG SER-MCNC: 1.67 UG/ML
S PNEUM DA 7F IGG SER-MCNC: 0.81 UG/ML
S PNEUM DA 8 IGG SER-MCNC: 2.28 UG/ML
S PNEUM DA 9N IGG SER-MCNC: 1.69 UG/ML
S PNEUM DA 9V IGG SER-MCNC: 2.45 UG/ML
S PNEUM SEROTYPE IGG SER-IMP: NORMAL

## 2024-11-05 PROCEDURE — 96160 PT-FOCUSED HLTH RISK ASSMT: CPT | Performed by: ALLERGY & IMMUNOLOGY

## 2024-11-05 PROCEDURE — 99214 OFFICE O/P EST MOD 30 MIN: CPT | Performed by: ALLERGY & IMMUNOLOGY

## 2024-11-05 PROCEDURE — 3008F BODY MASS INDEX DOCD: CPT | Performed by: ALLERGY & IMMUNOLOGY

## 2024-11-05 RX ORDER — DESONIDE 0.5 MG/G
OINTMENT TOPICAL 2 TIMES DAILY
Qty: 60 G | Refills: 1 | Status: SHIPPED | OUTPATIENT
Start: 2024-11-05 | End: 2025-11-05

## 2024-11-05 NOTE — PATIENT INSTRUCTIONS
Tetanus and S. Pneumo antibodies ok.   Diptheria titer is low and could be boosted with primary doctor.    Try topical steroid to help with stubborn areas of eczema and continue with dupixent.    High eosinophils are related to Dupixent and this is transient.  Will recheck in a month-orders in.

## 2024-11-05 NOTE — PROGRESS NOTES
Patient ID: Charmayne Chiracu is a 33 y.o. female.     Chief Complaint: follow up  History Of Present Illness  Charmayne Chiracu is a 33 y.o. female with PMx allergic rhinitis, severe persistent asthma and topic dermatitis presenting for follow up.         Food Allergy  Avoids tree nut and seafood.  History of OAS.    Eczema/ Atopic Dermatitis  Dupixent q 2 wk Has some improvement, but still with some stubborn areas on the arms and pop sol.  Only using topical moisturizers. Triamcinolone in the past was not helpful.  Denies side effects from injections.  specialty supplies med.  Followed by NP in derm clinic.    Asthma  Severe persistent. Controlled at this time.  ACT 20. Bothered by extremes of temperature.    Rhinoconjunctivitis  AR symptoms.  Pending respiratory profile.    Drug Allergy   Reviewed    Insect Allergy   No    Infections  NML complement and IG's.  15/23 S. Pneumo and nml tetanus. Diptheria 0, needs boosted.      Review of Systems    Pertinent positives and negatives have been assessed in the HPI. All other systems have been reviewed and are negative except as noted in the HPI.    Allergies  Shellfish containing products, Tree nuts, Latex, Levofloxacin, and Amoxicillin    Past Medical History  She has a past medical history of Asthma ([facility]), Depression with anxiety, Gingiva disorder, History of headache, Migraine, unspecified, not intractable, without status migrainosus, Personal history of diseases of the skin and subcutaneous tissue, Personal history of other diseases of the respiratory system, Personal history of other endocrine, nutritional and metabolic disease, Personal history of other mental and behavioral disorders, and Seasonal allergies.    Family History  No family history on file.    No history of food allergy or atopic disease in the family.    Surgical History  She has a past surgical history that includes Appendectomy (06/27/2016) and Other surgical history  "(08/04/2022).    Social/Environmental History  She reports that she has never smoked. She has never been exposed to tobacco smoke. She has never used smokeless tobacco. She reports current alcohol use. She reports that she does not use drugs.        MEDICATIONS  Current Outpatient Medications on File Prior to Visit   Medication Sig Dispense Refill    albuterol 2.5 mg /3 mL (0.083 %) nebulizer solution USE 3 ML VIA NEBULIZER 3 TIMES DAILY AS NEEDED FOR WHEEZING/SHORTNESS OF BREATH. INHALE OVER 5 TO 15 MINUTES      albuterol 90 mcg/actuation inhaler INHALE 1 TO 2 PUFFS AS INSTRUCTED EVERY 4 HOURS AS NEEDED FOR WHEEZING/ SHORTNESS OF BREATH      amphetamine-dextroamphetamine XR (Adderall XR) 20 mg 24 hr capsule Take 1 capsule (20 mg) by mouth once daily in the morning. Do not crush or chew.      dupilumab (Dupixent) 300 mg/2 mL pen injector Inject 2 mL (300 mg) under the skin every 14 (fourteen) days. 4 mL 3    FLUoxetine (PROzac) 40 mg capsule Take 1 capsule (40 mg) by mouth once daily. Patient states she is taking 60mg      fluticasone propion-salmeteroL (Advair Diskus) 500-50 mcg/dose diskus inhaler Inhale 1 puff.      levocetirizine (Xyzal) 5 mg tablet Take by mouth once daily in the evening.      lisdexamfetamine (Vyvanse) 40 mg capsule Take 1 capsule (40 mg) by mouth once daily in the morning. Take before meals.      ondansetron HCl (ZOFRAN ORAL) Take by mouth.      OXcarbazepine (Trileptal) 150 mg tablet Take 1 tablet (150 mg) by mouth 2 times a day.      terbinafine (LamISIL) 250 mg tablet Take two tablets daily for 7 days a month for three months 14 tablet 2    triamcinolone (Kenalog) 0.1 % cream TAKE 1 OSWALDO APPLIED TOPICALLY 2 TIMES A DAY APPLY TO AFFECTED AREA TWICE DAILY       No current facility-administered medications on file prior to visit.         Physical Exam  Visit Vitals  BP 98/68   Pulse 110   Temp 36.7 °C (98 °F) (Temporal)   Resp 17   Ht 1.6 m (5' 3\")   Wt 55.8 kg (123 lb)   SpO2 99%   BMI 21.79 " kg/m²   OB Status Having periods   Smoking Status Never   BSA 1.57 m²       Wt Readings from Last 1 Encounters:   11/05/24 55.8 kg (123 lb)       Physical Exam    General: Well appearing, no acute distress  Head: Normocephalic, atraumatic, neck supple without lymphadenopathy  Eyes: EOMI, non-injected  Nose: No nasal crease, nares patent, slightly boggy turbinates, minimal discharge  Throat: Normal dentition, no erythema  Heart: Regular rate and rhythm  Lungs: Clear to auscultation bilaterally, effort normal  Abdomen: Soft, non-tender, normal bowel sounds  Extremities: Moves all extremities symmetrically, no edema  Skin: Hyperlinearity palms, no scaling. Antecubes and pop fos b/l erythema with mild lichenification, no scratches or super infections. Face is clear, lips dry.  Psych: normal mood and affect    LAB RESULTS:  CBC:  Recent Labs     11/01/24  1518 06/13/22  1326   WBC 8.6 8.8   HGB 13.3 14.9   HCT 39.4 43.3    289   MCV 94 93   EOSABS 1.68* 0.09       CMP:  Recent Labs     11/01/24  1518 06/13/22  1326    134*   K 4.2 3.2*    101   CO2 30 25   ANIONGAP 8* 11   BUN 15 18   CREATININE 0.67 0.85   EGFR >90  --      Recent Labs     11/01/24  1518   ALBUMIN 4.4   ALKPHOS 63   ALT 11   AST 18   BILITOT 0.4         Recent Labs     11/01/24  1518 06/13/22  1326   EOSABS 1.68* 0.09         IMMUNO:   Recent Labs     11/01/24  1518   IGG 1,270             ABO:   Recent Labs     06/17/24  1359   ABO O       HEME/ENDO:  Recent Labs     01/24/24  1448 08/29/22  1007 01/06/22  1612   TSH 1.32 1.08  --    HGBA1C  --   --  5.1         Assessment/Plan   Charmayne is a 32 yo woman with allergic rhinitis, atopic dermatitis, severe asthma, food allergy.  Immune labs overall good with low titer for diphtheria. Booster can be done with primary care doctor. Other titers and immune labs are good.  Still pening respiratory profile will call results for allergen avoidance advice.  Dupixent improving  skin wihtout side effect other than eosinophilia which is typically transient side effect of this medication. Will recheck in about a month for resolution of this.  Mild topical steroid for difficult skin areas. Continue derm clinic follow up and monitoring.  Follow up planned for two months.  Nalini Pierce DO

## 2024-11-12 LAB
A ALTERNATA IGE QN: <0.1 KU/L
A FUMIGATUS IGE QN: 0.27 KU/L
BERMUDA GRASS IGE QN: <0.1 KU/L
BOXELDER IGE QN: 0.13 KU/L
C HERBARUM IGE QN: 0.44 KU/L
CALIF WALNUT POLN IGE QN: <0.1 KU/L
CAT DANDER IGE QN: 0.87 KU/L
CMN PIGWEED IGE QN: <0.1 KU/L
COMMON RAGWEED IGE QN: 4.5 KU/L
COTTONWOOD IGE QN: <0.1 KU/L
D FARINAE IGE QN: 4.13 KU/L
D PTERONYSS IGE QN: 4.8 KU/L
DOG DANDER IGE QN: 4.68 KU/L
ENGL PLANTAIN IGE QN: <0.1 KU/L
GOOSEFOOT IGE QN: <0.1 KU/L
JOHNSON GRASS IGE QN: <0.1 KU/L
KENT BLUE GRASS IGE QN: 1.57 KU/L
LONDON PLANE IGE QN: <0.1 KU/L
MT JUNIPER IGE QN: <0.1 KU/L
P NOTATUM IGE QN: <0.1 KU/L
PECAN/HICK TREE IGE QN: <0.1 KU/L
ROACH IGE QN: <0.1 KU/L
SALTWORT IGE QN: <0.1 KU/L
SHEEP SORREL IGE QN: <0.1 KU/L
SILVER BIRCH IGE QN: <0.1 KU/L
TIMOTHY IGE QN: 0.83 KU/L
TOTAL IGE SMQN RAST: 130 KU/L
WHITE ASH IGE QN: <0.1 KU/L
WHITE ELM IGE QN: 0.16 KU/L
WHITE MULBERRY IGE QN: <0.1 KU/L
WHITE OAK IGE QN: <0.1 KU/L

## 2024-11-14 ENCOUNTER — TELEPHONE (OUTPATIENT)
Dept: ALLERGY | Facility: CLINIC | Age: 34
End: 2024-11-14
Payer: COMMERCIAL

## 2024-11-14 NOTE — TELEPHONE ENCOUNTER
LM with allergy lab: Cat, dog, dust mite, grass and ragweed allergy. Mold sensitizations. Call with questions.

## 2024-11-27 ENCOUNTER — APPOINTMENT (OUTPATIENT)
Dept: DERMATOLOGY | Facility: CLINIC | Age: 34
End: 2024-11-27
Payer: COMMERCIAL

## 2024-12-23 ENCOUNTER — LAB (OUTPATIENT)
Dept: LAB | Facility: LAB | Age: 34
End: 2024-12-23
Payer: COMMERCIAL

## 2024-12-23 ENCOUNTER — TELEPHONE (OUTPATIENT)
Dept: ENDOCRINOLOGY | Facility: CLINIC | Age: 34
End: 2024-12-23

## 2024-12-23 DIAGNOSIS — Z32.01 POSITIVE URINE PREGNANCY TEST (HHS-HCC): ICD-10-CM

## 2024-12-23 DIAGNOSIS — N97.9 FEMALE INFERTILITY: ICD-10-CM

## 2024-12-23 DIAGNOSIS — Z32.01 POSITIVE BLOOD PREGNANCY TEST (HHS-HCC): ICD-10-CM

## 2024-12-23 LAB — B-HCG SERPL-ACNC: 162 MIU/ML

## 2024-12-23 PROCEDURE — 84702 CHORIONIC GONADOTROPIN TEST: CPT

## 2024-12-23 RX ORDER — PROGESTERONE 100 MG/1
100 CAPSULE ORAL 2 TIMES DAILY
Qty: 60 CAPSULE | Refills: 0 | Status: SHIPPED | OUTPATIENT
Start: 2024-12-23 | End: 2025-12-23

## 2024-12-23 NOTE — PROGRESS NOTES
Initial HC  Patient's ЕЛЕНА Provider: Baylee Martin MD  Infertility dx:  possible endo  Achieved pregnancy by: Timed intercourse  Fertility medications used this cycle:  none  LMP: Patient's last menstrual period was   EGA based on (IUI date, ET ): LMP - 4w1d    Updated G/P with this pregnancy:   OB HX:  Miscarriage in 2021 & 2022 (2 total pregnancies), no D&Cs  First SAB- chemical pregnancy, took two years to conceive, 5-6 weeks, started bleeding a few days after her pos home preg test (pos HCG was done while bleeding)  Second SAB- 9 weeks, took them 6 months to conceive, both had food poisoning and towards the end she had bleeding, went to ER and tried to send her tissue to genetics but no results were obtained- gestational sac was seen but no YS or fetal pole, did not require D&C, HCG was 44,000       Type & Screen: 2024  ABO: O  Rh: NEG  Antibody: NEG  History of miscarriage: Yes, see above  History of pelvic/abdominal surgeries: No  History of STDs/PID: Yes, per consult note  Hx of ectopic: No  Hx of tubal disease/ blockage: No - normal at [facility]    Risk for ectopic:  Yes? For possible endo      Current medications:     Current Outpatient Medications:     albuterol 2.5 mg /3 mL (0.083 %) nebulizer solution, USE 3 ML VIA NEBULIZER 3 TIMES DAILY AS NEEDED FOR WHEEZING/SHORTNESS OF BREATH. INHALE OVER 5 TO 15 MINUTES, Disp: , Rfl:     albuterol 90 mcg/actuation inhaler, INHALE 1 TO 2 PUFFS AS INSTRUCTED EVERY 4 HOURS AS NEEDED FOR WHEEZING/ SHORTNESS OF BREATH, Disp: , Rfl:     amphetamine-dextroamphetamine XR (Adderall XR) 20 mg 24 hr capsule, Take 1 capsule (20 mg) by mouth once daily in the morning. Do not crush or chew., Disp: , Rfl:     desonide (DesOwen) 0.05 % ointment, Apply topically 2 times a day., Disp: 60 g, Rfl: 1    dupilumab (Dupixent) 300 mg/2 mL pen injector, Inject 2 mL (300 mg) under the skin every 14 (fourteen) days., Disp: 4 mL, Rfl: 3    FLUoxetine (PROzac) 40 mg capsule,  Take 1 capsule (40 mg) by mouth once daily. Patient states she is taking 60mg, Disp: , Rfl:     fluticasone propion-salmeteroL (Advair Diskus) 500-50 mcg/dose diskus inhaler, Inhale 1 puff., Disp: , Rfl:     levocetirizine (Xyzal) 5 mg tablet, Take by mouth once daily in the evening., Disp: , Rfl:     lisdexamfetamine (Vyvanse) 40 mg capsule, Take 1 capsule (40 mg) by mouth once daily in the morning. Take before meals., Disp: , Rfl:     ondansetron HCl (ZOFRAN ORAL), Take by mouth., Disp: , Rfl:     OXcarbazepine (Trileptal) 150 mg tablet, Take 1 tablet (150 mg) by mouth 2 times a day., Disp: , Rfl:     terbinafine (LamISIL) 250 mg tablet, Take two tablets daily for 7 days a month for three months, Disp: 14 tablet, Rfl: 2    PNV: Yes  Vitamin D 2000 IUs: Yes  Luteal support:  will discuss starting today  Additional medications:   oxcarbazepine (anticonvulsant), prozac, Vyvanse 40mg, dupixent shot for excema (last was 2 weeks ago), advar. Patient stated she stopped and has not taken any yet today, and advised to contact her ordering provider on all the medications to discuss pregnancy and if they would need to be changed rather than just stopping cold turkey.      Labs ordered/Plan:   [facility] ordered. Team will reach out to patient with results and plan.   Discussed early pregnancy versus miscarriage versus ectopic. Precautions given.   Patient expresses understanding of plan and is agreeable.    Ramila Ward  12/23/2024  4:24 PM    [facility]  Ultrasound and/or labs reviewed at [facility].     Results for orders placed or performed in visit on 12/23/24 (from the past 96 hours)   (Lab Collect) Human Chorionic Gonadotropin, Serum Quantitative   Result Value Ref Range    HCG, Beta-Quantitative 162 (H) <5 mIU/mL       Lab Results   Component Value Date    HCGQUANT 162 (H) 12/23/2024    HCGQUANT <2 12/30/2022    HCGQUANT 44,701 (A) 06/13/2022       RTC in  12/27  for HCG and  labs only .   Mlilicent ZAVALA  Sivakumar  12/23/2024  4:41 PM      Phone call to patient to discuss results of blood pregnancy test. Shared with patient that HCG level is 162 and they are 4 weeks and 1 days pregnant. Reviewed with patient that we are cautiously optimistic that this is a good level. Reviewed with patient to continue all current medications and that we will plan to repeat level in 4 days due to ectopic risk factors. Reviewed with patient that if they experience any of the following symptoms please call the office during office hours, if after hours, go to the nearest emergency room: unrelieved or worsening abdominal pain, one sided pelvic pain, bleeding like a period, dizziness, or shoulder pain.  She verbalizes understanding and is agreeable.    Ramila Ward 12/23/24 4:45 PM

## 2024-12-23 NOTE — TELEPHONE ENCOUNTER
Returned patient's call. Patient was planning IVF, and was not expecting +UPT, but called today with a positive pregnancy test.  LMP: 11/24 - patient is unsure when she ovulated. Based off LMP, would be about 4w1d today.   Patient confirmed, she normally takes daily: oxcarbazepine (anticonvulsant), prozac, Vyvanse 40mg, dupixent shot for excema (last was 2 weeks ago), advar. Patient stated she stopped and has not taken any yet today, and advised to contact her ordering provider on all the medications to discuss pregnancy and if they would need to be changed rather than just stopping cold turkey.    Patient started a prenatal, and will start vitamin D 2000IU.     [facility] and place in noon huddle to review results.     Ramila Ward 12/23/24 12:04 PM

## 2024-12-27 ENCOUNTER — LAB (OUTPATIENT)
Dept: LAB | Facility: LAB | Age: 34
End: 2024-12-27
Payer: COMMERCIAL

## 2024-12-27 DIAGNOSIS — O36.80X0 ENCOUNTER TO DETERMINE FETAL VIABILITY OF PREGNANCY, NOT APPLICABLE OR UNSPECIFIED FETUS: ICD-10-CM

## 2024-12-27 DIAGNOSIS — Z32.01 POSITIVE BLOOD PREGNANCY TEST (HHS-HCC): ICD-10-CM

## 2024-12-27 DIAGNOSIS — D72.10 EOSINOPHILIA, UNSPECIFIED TYPE: ICD-10-CM

## 2024-12-27 DIAGNOSIS — Z32.01 POSITIVE BLOOD PREGNANCY TEST (HHS-HCC): Primary | ICD-10-CM

## 2024-12-27 DIAGNOSIS — L20.89 OTHER ATOPIC DERMATITIS: ICD-10-CM

## 2024-12-27 LAB
B-HCG SERPL-ACNC: 933 MIU/ML
BASOPHILS # BLD AUTO: 0.07 X10*3/UL (ref 0–0.1)
BASOPHILS NFR BLD AUTO: 0.8 %
EOSINOPHIL # BLD AUTO: 1.19 X10*3/UL (ref 0–0.7)
EOSINOPHIL NFR BLD AUTO: 14.3 %
ERYTHROCYTE [DISTWIDTH] IN BLOOD BY AUTOMATED COUNT: 12.7 % (ref 11.5–14.5)
HCT VFR BLD AUTO: 37.1 % (ref 36–46)
HGB BLD-MCNC: 12.5 G/DL (ref 12–16)
IMM GRANULOCYTES # BLD AUTO: 0.02 X10*3/UL (ref 0–0.7)
IMM GRANULOCYTES NFR BLD AUTO: 0.2 % (ref 0–0.9)
LYMPHOCYTES # BLD AUTO: 1.84 X10*3/UL (ref 1.2–4.8)
LYMPHOCYTES NFR BLD AUTO: 22.1 %
MCH RBC QN AUTO: 31.4 PG (ref 26–34)
MCHC RBC AUTO-ENTMCNC: 33.7 G/DL (ref 32–36)
MCV RBC AUTO: 93 FL (ref 80–100)
MONOCYTES # BLD AUTO: 0.55 X10*3/UL (ref 0.1–1)
MONOCYTES NFR BLD AUTO: 6.6 %
NEUTROPHILS # BLD AUTO: 4.67 X10*3/UL (ref 1.2–7.7)
NEUTROPHILS NFR BLD AUTO: 56 %
NRBC BLD-RTO: 0 /100 WBCS (ref 0–0)
PLATELET # BLD AUTO: 313 X10*3/UL (ref 150–450)
RBC # BLD AUTO: 3.98 X10*6/UL (ref 4–5.2)
WBC # BLD AUTO: 8.3 X10*3/UL (ref 4.4–11.3)

## 2024-12-27 PROCEDURE — 84702 CHORIONIC GONADOTROPIN TEST: CPT

## 2024-12-27 PROCEDURE — 85025 COMPLETE CBC W/AUTO DIFF WBC: CPT

## 2024-12-27 NOTE — PROGRESS NOTES
Initial HC  Patient's ЕЛЕНА Provider: Baylee Martin MD  Infertility dx:  possible endo  Achieved pregnancy by: Timed intercourse  Fertility medications used this cycle:  none  LMP: Patient's last menstrual period was   EGA based on (IUI date, ET ): LMP - 4w5d     Updated G/P with this pregnancy:   OB HX:  First SAB- chemical pregnancy, took two years to conceive, 5-6 weeks, started bleeding a few days after her pos home preg test (pos HCG was done while bleeding)  Second SAB- 9 weeks, took them 6 months to conceive, both had food poisoning and towards the end she had bleeding, went to ER and tried to send her tissue to genetics but no results were obtained- gestational sac was seen but no YS or fetal pole, did not require D&C, HCG was 44,000        Type & Screen: 2024  ABO: O  Rh: NEG  Antibody: NEG  History of miscarriage: Yes, see above  History of pelvic/abdominal surgeries: No  History of STDs/PID: Yes, per consult note  Hx of ectopic: No  Hx of tubal disease/ blockage: No - normal at [facility]     Risk for ectopic:  Yes? For possible endo        Current medications:   PNV: Yes  Vitamin D 2000 IUs: Yes  Luteal support:  will discuss starting today  Additional medications:   oxcarbazepine (anticonvulsant)   Prozac  Vyvanse 40mg  dupixent shot for excema (last was 2 weeks ago)   Advair inhaler   Patient stated she stopped and has not taken any yet, and advised to contact her ordering provider on all the medications to discuss pregnancy and if they would need to be changed rather than just stopping cold turkey.      Patient going to outpatient  lab this morning for repeat bhcg following natural TIC cycle. Based off LMP, patient would be about 4w5d today. She is risk for ectopic for possible endometriosis.   Will contact patient with results and plan.   Ramila Ward 24 6:53 AM    Component      Latest Ref Rng 2024   HCG, Beta-Quantitative      <5 mIU/mL 162 (H)  933 (H)        Monitoring patient: Natural/TIC cycle, LMP on 11-, 4.6 wks, repeat HCG Level today with appropriate rise to 933 from 162 on 12-. Patient to do an early OB Scan for ectopic risk for possible endometriosis + repeat HCG Level on 12-, ectopic precautions to be given and continue current medication protocol. Plan to be communicated by RN. Plan agreed upon by Dr. Palacios. Brook Morrison CNP 12/27/24 4:57 PM     Message sent to patient to schedule OB scan and repeat bhcg for 12/30 - message sent to FD to call patient this weekend with time to come in Monday.  Ramila Ward 12/27/24 4:50 PM

## 2024-12-29 DIAGNOSIS — O36.80X0 ENCOUNTER TO DETERMINE FETAL VIABILITY OF PREGNANCY, NOT APPLICABLE OR UNSPECIFIED FETUS: Primary | ICD-10-CM

## 2024-12-30 ENCOUNTER — OFFICE VISIT (OUTPATIENT)
Dept: ENDOCRINOLOGY | Facility: CLINIC | Age: 34
End: 2024-12-30
Payer: COMMERCIAL

## 2024-12-30 ENCOUNTER — TELEPHONE (OUTPATIENT)
Dept: ALLERGY | Facility: CLINIC | Age: 34
End: 2024-12-30

## 2024-12-30 ENCOUNTER — ANCILLARY PROCEDURE (OUTPATIENT)
Dept: ENDOCRINOLOGY | Facility: CLINIC | Age: 34
End: 2024-12-30
Payer: COMMERCIAL

## 2024-12-30 DIAGNOSIS — O36.80X0 ENCOUNTER TO DETERMINE FETAL VIABILITY OF PREGNANCY, NOT APPLICABLE OR UNSPECIFIED FETUS: ICD-10-CM

## 2024-12-30 DIAGNOSIS — O36.80X0 ENCOUNTER TO DETERMINE FETAL VIABILITY OF PREGNANCY, SINGLE OR UNSPECIFIED FETUS: Primary | ICD-10-CM

## 2024-12-30 PROCEDURE — 99212 OFFICE O/P EST SF 10 MIN: CPT

## 2024-12-30 PROCEDURE — 99212 OFFICE O/P EST SF 10 MIN: CPT | Mod: 25

## 2024-12-30 PROCEDURE — 76817 TRANSVAGINAL US OBSTETRIC: CPT

## 2024-12-30 NOTE — PROGRESS NOTES
Visit Type: In Person/called on phone    Early OB Scan for Ectopic Risk    Ectopic risk factor: yes- possible endometriosis  PGTA/PGTM: no  Blood type: O negative  Method of Conception: Unmedicated/TIC, LMP 2024- 5.2 wks  Meds: PNV daily & Prometrium 100 mg BID PV    Reviewed results from OB ultrasound today and results reviewed with pt as follows:     OB Scan results:   Assessment LMP on 2024.  present. POOJA: 2025. Gest. age 5 w + 1 d  Dating Date Details Gest. age POOJA  LMP 2024: 5 w + 1 d, POOJA 2025  Assessment Gestational sac: visualized. Location: intrauterine  Yolk sac: uncertain  Embryo: not visualized  Cardiac activity: No Fetal Pole seen  Early placenta: circumferential  GS 4.7 mm   YS 0.8 mm  Maternal  Structures  Uterus Visualized  Position: anteverted  Cervix Visualized  Right Ovary Visualized  Size 24.9 mm x 15.9 mm x 23.2 mm. Vol 4.8 cm³  Appearance: Normal  Left Ovary Visualized  Size 26.1 mm x 39.6 mm x 27.6 mm. Vol 14.9 cm³  Appearance: Normal  Corpus luteum: hemorrhagic. Size 14.8 mm x 12.1 mm x 10.6 mm  Cul de Sac Visualized. Free fluid visualized  Largest pool 18.6 mm x 21.0 mm x 6.0 mm. Vol 1.227 ml  ROXIE measures 5 x 4 x 8 mm.  GS lending towards Left cornua     Detailed discussion with patient about her scan results, pregnancy, and next steps:    Plan:   Discussed IUP seen today with GS and YS  Reviewed medications in detail. She will continue PNV & Prometrium.   Reviewed supplemental progesterone, route and dose, reviewed dates of cessation. Last day to take Prometrium is on 2025.  Discussed with patient any pregnancy concerns and discussed establishing care with an OB.  Will provide recommendations if she desires.   Advised to call with bleeding, pain or concerns. Denies any pain/bleeding, c/o intermittent cramping more due to constipation.   Repeat OB Scan 1 week  Discussed left angular pregnancy- discussed risks and management    Ultrasound results  and Plan of care reviewed with MD Brook Chau, LM 12/30/24 5:30 PM

## 2025-01-06 ENCOUNTER — ANCILLARY PROCEDURE (OUTPATIENT)
Dept: ENDOCRINOLOGY | Facility: CLINIC | Age: 35
End: 2025-01-06
Payer: COMMERCIAL

## 2025-01-06 ENCOUNTER — OFFICE VISIT (OUTPATIENT)
Dept: ENDOCRINOLOGY | Facility: CLINIC | Age: 35
End: 2025-01-06
Payer: COMMERCIAL

## 2025-01-06 DIAGNOSIS — O36.80X0 ENCOUNTER TO DETERMINE FETAL VIABILITY OF PREGNANCY, NOT APPLICABLE OR UNSPECIFIED FETUS: ICD-10-CM

## 2025-01-06 DIAGNOSIS — O36.80X0 ENCOUNTER TO DETERMINE FETAL VIABILITY OF PREGNANCY, NOT APPLICABLE OR UNSPECIFIED FETUS: Primary | ICD-10-CM

## 2025-01-06 PROCEDURE — 76817 TRANSVAGINAL US OBSTETRIC: CPT

## 2025-01-06 PROCEDURE — 1036F TOBACCO NON-USER: CPT | Performed by: NURSE PRACTITIONER

## 2025-01-06 PROCEDURE — 99213 OFFICE O/P EST LOW 20 MIN: CPT | Performed by: NURSE PRACTITIONER

## 2025-01-06 PROCEDURE — 99213 OFFICE O/P EST LOW 20 MIN: CPT | Mod: 25 | Performed by: NURSE PRACTITIONER

## 2025-01-06 NOTE — PROGRESS NOTES
Visit Type: In Person    OB Scan    Ectopic risk factor: yes  PGTA/PGTM: no  Blood type: O  Method of Conception: Unmedicated    Reviewed results from OB ultrasound today and results reviewed with pt as follows:     OB Scan results:   Confirm Gesta??onal Age  Pregnancy Harmon pregnancy. Number of embryos: 1  Assessment LMP on 2024.  present. POOJA: 2025. Gest. age 6 w + 1 d  Da??ng Date Details Gest. age POOJA  LMP 2024 6 w + 1 d 2025  U/S 2025 based upon CRL 6 w + 1 d 2025  Assigned  da??ng  based on the LMP, selected on 2024 6 w + 1 d 2025  Assessment Gesta??onal sac: visualized. Loca??on: intrauterine  Yolk sac: visualized  Embryo: visualized  Cardiac ac??vity: present  Early placenta: circumferen??al  YS 2.9 mm -/- 14% Papaioannou  CRL 2.5 mm 6w 1d 42% Pexsters   bpm    Detailed discussion with patient about her scan results, pregnancy, and next steps:    Plan:   Reviewed medications in detail. She will continue PNV.   Reviewed supplemental progesterone, route and dose, reviewed dates of cessation. 10 weeks 6 days  Discussed with patient any pregnancy concerns and discussed establishing care with an  OB.  Will provide recommendations if she desires.   Advised to call with bleeding, pain or concerns.    Ultrasound results and Plan of care reviewed with Dr. Baylee Martin MD    Fertility Plan Update: Repeat scan in 10 days.  Also schedule OB visit- call this week to schedule.     Millicent Shaver  2025  1:27 PM

## 2025-01-07 ENCOUNTER — APPOINTMENT (OUTPATIENT)
Dept: ALLERGY | Facility: CLINIC | Age: 35
End: 2025-01-07
Payer: COMMERCIAL

## 2025-01-08 ENCOUNTER — APPOINTMENT (OUTPATIENT)
Dept: ALLERGY | Facility: CLINIC | Age: 35
End: 2025-01-08
Payer: COMMERCIAL

## 2025-01-08 VITALS
OXYGEN SATURATION: 98 % | RESPIRATION RATE: 17 BRPM | DIASTOLIC BLOOD PRESSURE: 68 MMHG | BODY MASS INDEX: 22.68 KG/M2 | HEART RATE: 71 BPM | WEIGHT: 128 LBS | HEIGHT: 63 IN | TEMPERATURE: 98.4 F | SYSTOLIC BLOOD PRESSURE: 100 MMHG

## 2025-01-08 DIAGNOSIS — J30.9 ALLERGIC RHINITIS, UNSPECIFIED SEASONALITY, UNSPECIFIED TRIGGER: ICD-10-CM

## 2025-01-08 DIAGNOSIS — J45.50 SEVERE PERSISTENT ASTHMA WITHOUT COMPLICATION (MULTI): Primary | ICD-10-CM

## 2025-01-08 DIAGNOSIS — L20.9 ATOPIC DERMATITIS, UNSPECIFIED TYPE: ICD-10-CM

## 2025-01-08 PROCEDURE — 99214 OFFICE O/P EST MOD 30 MIN: CPT | Performed by: ALLERGY & IMMUNOLOGY

## 2025-01-08 PROCEDURE — 3008F BODY MASS INDEX DOCD: CPT | Performed by: ALLERGY & IMMUNOLOGY

## 2025-01-08 RX ORDER — FLUTICASONE PROPIONATE AND SALMETEROL 500; 50 UG/1; UG/1
1 POWDER RESPIRATORY (INHALATION)
Qty: 1 EACH | Refills: 11 | Status: SHIPPED | OUTPATIENT
Start: 2025-01-08 | End: 2025-02-07

## 2025-01-08 RX ORDER — PREDNISONE 20 MG/1
20 TABLET ORAL 2 TIMES DAILY
Qty: 10 TABLET | Refills: 0 | Status: SHIPPED | OUTPATIENT
Start: 2025-01-08 | End: 2025-01-13

## 2025-01-08 NOTE — PATIENT INSTRUCTIONS
I will request some updated data regarding the Dupixent.  Continue with Advair twice daily.  Your topical medications are ok for now.

## 2025-01-08 NOTE — PROGRESS NOTES
Patient ID: Charmayne Chiracu is a 34 y.o. female.     Chief Complaint: follow up last seen November 2024  History Of Present Illness  Charmayne Chiracu is a 34 y.o. female with PMx allergic rhinitis, severe persistent asthma and atopic dermatitis presenting for follow up  Just found out she is 6 wks pregnant.    Food Allergy  Avoids tree nut and seafood  History of OAS    Eczema/ Atopic Dermatitis  Has been on Dupixent q 2 wk. Last dose was December 8th.  Eucerin as moisturizer.  Desonide prn, has not used since found out she was pregnant.  Sees NP in derm clinic      Asthma  Severe persistent  Current medications: Advair 500 BID  Using Albuterol only as needed.  Rare need for this.      Rhinoconjunctivitis  AR: Respiratory profile - pollen, dust mite and pet allergy  Uses Claritin as needed.    Drug Allergy   No    Insect Allergy   No    Infections  NML complement and IG's.  15/23 S. Pneumo and nml tetanus. Diptheria 0, needs boosted.    Review of Systems    Pertinent positives and negatives have been assessed in the HPI. All other systems have been reviewed and are negative except as noted in the HPI.    Allergies  Shellfish containing products, Tree nuts, Latex, Levofloxacin, and Amoxicillin    Past Medical History  She has a past medical history of Asthma, Depression with anxiety, Gingiva disorder, History of headache, Migraine, unspecified, not intractable, without status migrainosus, Personal history of diseases of the skin and subcutaneous tissue, Personal history of other diseases of the respiratory system, Personal history of other endocrine, nutritional and metabolic disease, Personal history of other mental and behavioral disorders, and Seasonal allergies.    Family History  No family history on file.    Surgical History  She has a past surgical history that includes Appendectomy (06/27/2016) and Other surgical history (08/04/2022).    Social/Environmental History  She reports that she has never smoked.  "She has never been exposed to tobacco smoke. She has never used smokeless tobacco. She reports current alcohol use. She reports that she does not use drugs.        MEDICATIONS  Current Outpatient Medications on File Prior to Visit   Medication Sig Dispense Refill    albuterol 2.5 mg /3 mL (0.083 %) nebulizer solution USE 3 ML VIA NEBULIZER 3 TIMES DAILY AS NEEDED FOR WHEEZING/SHORTNESS OF BREATH. INHALE OVER 5 TO 15 MINUTES      albuterol 90 mcg/actuation inhaler INHALE 1 TO 2 PUFFS AS INSTRUCTED EVERY 4 HOURS AS NEEDED FOR WHEEZING/ SHORTNESS OF BREATH      amphetamine-dextroamphetamine XR (Adderall XR) 20 mg 24 hr capsule Take 1 capsule (20 mg) by mouth once daily in the morning. Do not crush or chew.      desonide (DesOwen) 0.05 % ointment Apply topically 2 times a day. 60 g 1    FLUoxetine (PROzac) 40 mg capsule Take 1 capsule (40 mg) by mouth once daily. Patient states she is taking 60mg      fluticasone propion-salmeteroL (Advair Diskus) 500-50 mcg/dose diskus inhaler Inhale 1 puff.      progesterone (Prometrium) 100 mg capsule Insert 1 capsule (100 mg) into the vagina 2 times a day. 60 capsule 0     No current facility-administered medications on file prior to visit.         Physical Exam  Visit Vitals  /68   Pulse 71   Temp 36.9 °C (98.4 °F) (Temporal)   Resp 17   Ht 1.6 m (5' 3\")   Wt 58.1 kg (128 lb)   SpO2 98%   BMI 22.67 kg/m²   OB Status Having periods   Smoking Status Never   BSA 1.61 m²       Wt Readings from Last 1 Encounters:   01/08/25 58.1 kg (128 lb)       Physical Exam    General: Well appearing, no acute distress  Head: Normocephalic, atraumatic, neck supple without lymphadenopathy  Eyes: EOMI, non-injected  Nose: No nasal crease, nares patent, boggy turbinates, minimal discharge  Throat: Normal dentition, no erythema  Heart: Regular rate and rhythm  Lungs: Clear to auscultation bilaterally, effort normal  Extremities: Moves all extremities symmetrically, no edema  Skin: Hyperlinearity " palms, no scaling. Antecubes and pop fos b/l without erythema today,  mild lichenification, no scratches or super infections. Face is clear, lips dry.   Psych: normal mood and affect    LAB RESULTS:  CBC:  Recent Labs     12/27/24  1226 11/01/24  1518 06/13/22  1326   WBC 8.3 8.6 8.8   HGB 12.5 13.3 14.9   HCT 37.1 39.4 43.3    360 289   MCV 93 94 93   EOSABS 1.19* 1.68* 0.09       CMP:  Recent Labs     11/01/24  1518 06/13/22  1326    134*   K 4.2 3.2*    101   CO2 30 25   ANIONGAP 8* 11   BUN 15 18   CREATININE 0.67 0.85   EGFR >90  --      Recent Labs     11/01/24  1518   ALBUMIN 4.4   ALKPHOS 63   ALT 11   AST 18   BILITOT 0.4       ALLERGY:   Lab Results   Component Value Date    ICIGE 130 11/01/2024    WHITEASH <0.10 11/01/2024    SILVERBIRCH <0.10 11/01/2024    BOXELDER 0.13 (Equiv IgE) 11/01/2024    MOUNTJUNIPER <0.10 11/01/2024    COTTONWOOD <0.10 11/01/2024    ELM 0.16 (Equiv IgE) 11/01/2024    MULBERRY <0.10 11/01/2024    PECANHICKORY <0.10 11/01/2024    MAPLESYCAMOR <0.10 11/01/2024    OAK <0.10 11/01/2024    BERMUDAGR <0.10 11/01/2024    JOHNSONGR <0.10 11/01/2024    BLUEGRASS 1.57 (Mod) 11/01/2024    TIMOTHYGRASS 0.83 (Mod) 11/01/2024     Lab Results   Component Value Date    LAMBQUART <0.10 11/01/2024    PIGWEED <0.10 11/01/2024    COMRAGWEED 4.50 (High) 11/01/2024    RUSSIANT <0.10 11/01/2024    SHEEPSOR <0.10 11/01/2024    PLANTAIN <0.10 11/01/2024    CATEPI 0.87 (Mod) 11/01/2024    DOGEPI 4.68 (High) 11/01/2024    ALTERNA <0.10 11/01/2024    CLADHERB 0.44 (Low) 11/01/2024    ICA04 0.27 (Equiv IgE) 11/01/2024    PENICILLIUM <0.10 11/01/2024    DERMFAR 4.13 (High) 11/01/2024    DERMPTE 4.80 (High) 11/01/2024    COCKR <0.10 11/01/2024       Recent Labs     11/01/24  1518   ICIGE 130     Recent Labs     12/27/24  1226 11/01/24  1518 06/13/22  1326   EOSABS 1.19* 1.68* 0.09       IMMUNO:   Recent Labs     11/01/24  1518   IGG 1,270               ABO:   Recent Labs      06/17/24  1359   ABO O       HEME/ENDO:  Recent Labs     01/24/24  1448 08/29/22  1007 01/06/22  1612   TSH 1.32 1.08  --    HGBA1C  --   --  5.1       ACT 20  Assessment/Plan   Eden is a 33 yo woman with severe atopic disease including atopic dermatitis, food and environmental allergy and severe persistent asthma. She has been well controlled on Dupixent, Advair, allergy meds and avoidance measures. She is in her 1st trimeter of pregnancy and feeling well, but is trying to decide what to do with her biologic therapy which we did discuss. I will update her with the latest details of this therapy in pregnancy. Other medications will be continued. I will have her follow up in 4-6 wks.    Nalini Pierce,

## 2025-01-09 ENCOUNTER — TELEPHONE (OUTPATIENT)
Dept: ENDOCRINOLOGY | Facility: CLINIC | Age: 35
End: 2025-01-09
Payer: COMMERCIAL

## 2025-01-09 NOTE — TELEPHONE ENCOUNTER
Returned patient's call.  Let her know per provider LM English ok for patient to have OB scan on 1/13.   Patient ok with date and transferred to  for scheduling.     Vee Lundberg 01/09/25 2:35 PM

## 2025-01-09 NOTE — TELEPHONE ENCOUNTER
Reason for call: Call Back  Notes: Pt called to schedule OB scan for 1/16 but wanted to check if she can schedule for either 1/13 or 1/20 instead.

## 2025-01-13 ENCOUNTER — ANCILLARY PROCEDURE (OUTPATIENT)
Dept: ENDOCRINOLOGY | Facility: CLINIC | Age: 35
End: 2025-01-13
Payer: COMMERCIAL

## 2025-01-13 ENCOUNTER — OFFICE VISIT (OUTPATIENT)
Dept: ENDOCRINOLOGY | Facility: CLINIC | Age: 35
End: 2025-01-13
Payer: COMMERCIAL

## 2025-01-13 DIAGNOSIS — O36.80X0 ENCOUNTER TO DETERMINE FETAL VIABILITY OF PREGNANCY, NOT APPLICABLE OR UNSPECIFIED FETUS: ICD-10-CM

## 2025-01-13 DIAGNOSIS — O36.80X0 ENCOUNTER TO DETERMINE FETAL VIABILITY OF PREGNANCY, SINGLE OR UNSPECIFIED FETUS: Primary | ICD-10-CM

## 2025-01-13 PROCEDURE — 99213 OFFICE O/P EST LOW 20 MIN: CPT | Mod: 25 | Performed by: NURSE PRACTITIONER

## 2025-01-13 PROCEDURE — 76817 TRANSVAGINAL US OBSTETRIC: CPT

## 2025-01-13 NOTE — PROGRESS NOTES
Visit Type: In Person    OB Scan    Ectopic risk factor: yes  PGTA/PGTM: no  Blood type: O  Method of Conception: Unmedicated    Reviewed results from OB ultrasound today and results reviewed with pt as follows:     OB Scan results:   Confirm Gesta??onal Age  Pregnancy Harmon pregnancy. Number of embryos: 1  Assessment LMP on 2024.  present. POOJA: 2025. Gest. age 7 w + 1 d  Da??ng Date Details Gest. age POOJA  LMP 2024 7 w + 1 d 2025  U/S 2025 based upon CRL 7 w + 1 d 2025  Assigned  da??ng  based on the LMP, selected on 2025 7 w + 1 d 2025  Assessment Gesta??onal sac: visualized. Loca??on: intrauterine  Yolk sac: visualized  Embryo: visualized  Cardiac ac??vity: present  Early placenta: circumferen??al  YS 2.4 mm -/- <1% Papaioannou  CRL 8.7 mm 7w 1d 46% Pexsters   bpm  Maternal  Structures  Uterus Visualized  Posi??on: anteverted  Cervix Visualized  Right Ovary Visualized  Appearance: Normal  Le?? Ovary Visualized  Appearance: Normal  Cul de Sac Visualized. No free fluid visualized  Method Transvaginal assessment was indicated due to increased accuracy of transvaginal evalua??on  for early pregnancy da??ng. View: Sufficient    Detailed discussion with patient about her scan results, pregnancy, and next steps:    Plan:   Reviewed medications in detail. She will continue PNV.   Reviewed supplemental progesterone, route and dose, reviewed dates of cessation. 25-last dose  Discussed with patient any pregnancy concerns and discussed establishing care with an  OB.  Will provide recommendations if she desires.   Advised to call with bleeding, pain or concerns.    Ultrasound results and Plan of care reviewed with Dr. Baylee Martin MD    Fertility Plan Update: Ok to move on to OB care.     Intimate Exam Performed: No, an intimate exam was not performed at this encounter.     Millicent Shaver  2025  1:56 PM

## 2025-01-20 DIAGNOSIS — J45.30 MILD PERSISTENT ASTHMA WITHOUT COMPLICATION (HHS-HCC): Primary | ICD-10-CM

## 2025-01-20 RX ORDER — ALBUTEROL SULFATE 90 UG/1
2 INHALANT RESPIRATORY (INHALATION) EVERY 4 HOURS PRN
Qty: 18 G | Refills: 3 | Status: SHIPPED | OUTPATIENT
Start: 2025-01-20 | End: 2025-02-19

## 2025-01-20 RX ORDER — ALBUTEROL SULFATE 0.83 MG/ML
2.5 SOLUTION RESPIRATORY (INHALATION) EVERY 4 HOURS PRN
Qty: 75 ML | Refills: 3 | Status: SHIPPED | OUTPATIENT
Start: 2025-01-20

## 2025-01-23 ENCOUNTER — APPOINTMENT (OUTPATIENT)
Dept: OBSTETRICS AND GYNECOLOGY | Facility: CLINIC | Age: 35
End: 2025-01-23
Payer: COMMERCIAL

## 2025-01-23 VITALS
WEIGHT: 131.38 LBS | BODY MASS INDEX: 23.27 KG/M2 | DIASTOLIC BLOOD PRESSURE: 71 MMHG | SYSTOLIC BLOOD PRESSURE: 106 MMHG

## 2025-01-23 DIAGNOSIS — F41.9 ANXIETY: ICD-10-CM

## 2025-01-23 DIAGNOSIS — Z14.1 CYSTIC FIBROSIS GENE CARRIER: ICD-10-CM

## 2025-01-23 DIAGNOSIS — Z34.91 PRENATAL CARE IN FIRST TRIMESTER (HHS-HCC): ICD-10-CM

## 2025-01-23 DIAGNOSIS — Z3A.08 8 WEEKS GESTATION OF PREGNANCY (HHS-HCC): ICD-10-CM

## 2025-01-23 DIAGNOSIS — G43.809 OTHER MIGRAINE WITHOUT STATUS MIGRAINOSUS, NOT INTRACTABLE: ICD-10-CM

## 2025-01-23 DIAGNOSIS — J45.40 MODERATE PERSISTENT ASTHMA WITHOUT COMPLICATION (HHS-HCC): Primary | ICD-10-CM

## 2025-01-23 PROBLEM — E84.9 CYSTIC FIBROSIS: Status: ACTIVE | Noted: 2019-12-10

## 2025-01-23 PROBLEM — G43.909 MIGRAINE HEADACHE: Status: ACTIVE | Noted: 2019-12-10

## 2025-01-23 PROBLEM — Z98.890 HISTORY OF LOOP ELECTROSURGICAL EXCISION PROCEDURE (LEEP): Status: ACTIVE | Noted: 2022-01-31

## 2025-01-23 PROBLEM — L30.1 DYSHIDROSIS (POMPHOLYX): Status: ACTIVE | Noted: 2018-01-17

## 2025-01-23 LAB — PREGNANCY TEST URINE, POC: POSITIVE

## 2025-01-23 PROCEDURE — 87086 URINE CULTURE/COLONY COUNT: CPT

## 2025-01-23 PROCEDURE — 87491 CHLMYD TRACH DNA AMP PROBE: CPT

## 2025-01-23 PROCEDURE — 87591 N.GONORRHOEAE DNA AMP PROB: CPT

## 2025-01-23 RX ORDER — MULTIVITAMIN/IRON/FOLIC ACID 18MG-0.4MG
1 TABLET ORAL DAILY
COMMUNITY

## 2025-01-23 RX ORDER — FLUOXETINE HYDROCHLORIDE 60 MG/1
60 TABLET, FILM COATED ORAL; ORAL DAILY
COMMUNITY

## 2025-01-23 RX ORDER — MAGNESIUM 250 MG
TABLET ORAL
COMMUNITY

## 2025-01-23 ASSESSMENT — EDINBURGH POSTNATAL DEPRESSION SCALE (EPDS)
I HAVE BEEN SO UNHAPPY THAT I HAVE HAD DIFFICULTY SLEEPING: NOT AT ALL
I HAVE BEEN ANXIOUS OR WORRIED FOR NO GOOD REASON: HARDLY EVER
I HAVE BEEN ABLE TO LAUGH AND SEE THE FUNNY SIDE OF THINGS: AS MUCH AS I ALWAYS COULD
TOTAL SCORE: 3
THINGS HAVE BEEN GETTING ON TOP OF ME: NO, MOST OF THE TIME I HAVE COPED QUITE WELL
I HAVE BLAMED MYSELF UNNECESSARILY WHEN THINGS WENT WRONG: NO, NEVER
I HAVE LOOKED FORWARD WITH ENJOYMENT TO THINGS: AS MUCH AS I EVER DID
I HAVE FELT SCARED OR PANICKY FOR NO GOOD REASON: NO, NOT MUCH
I HAVE FELT SAD OR MISERABLE: NO, NOT AT ALL
I HAVE BEEN SO UNHAPPY THAT I HAVE BEEN CRYING: NO, NEVER
THE THOUGHT OF HARMING MYSELF HAS OCCURRED TO ME: NEVER

## 2025-01-23 NOTE — PROGRESS NOTES
Initial Ob Visit  25    Subjective Charmayne Chiracu is a 34 y.o.  at 8w4d with a working estimated date of delivery of 2025, by Last Menstrual Period who presents for an initial prenatal visit. This pregnancy is planned. Was about to start fertility treatment, conceived naturally!!    Patient currently experiencing: nausea, declines anti-emetics  Bleeding or cramping since LMP: no  Taking prenatal vitamin: Yes  Ultrasound completed this pregnancy: Yes - C/W LMP dating  Vaccinated for flu?: Yes - 2025  Up to date on Covid booster?: No, agrees to get at local pharmacy    Last pap: 2024 NILM/HPV (-)    OB History    Para Term  AB Living   3 0 0 0 2 0   SAB IAB Ectopic Multiple Live Births   2 0 0 0 0      # Outcome Date GA Lbr Trevon/2nd Weight Sex Type Anes PTL Lv   3 Current            2 SAB            1 SAB              [place]  Depression Scale Total: 3  Prior pregnancy complications:  SAB x 2    History of hypertension:  No    Past Medical History:   Diagnosis Date    Asthma     Depression with anxiety     Gingiva disorder     History of headache     Migraine, unspecified, not intractable, without status migrainosus     Migraines    Personal history of diseases of the skin and subcutaneous tissue     History of eczema    Personal history of other diseases of the respiratory system     History of asthma    Personal history of other endocrine, nutritional and metabolic disease     History of cystic fibrosis    Personal history of other mental and behavioral disorders     History of anxiety    Seasonal allergies       Past Surgical History:   Procedure Laterality Date    APPENDECTOMY  2016    Appendectomy    OTHER SURGICAL HISTORY  2022    Loop electrosurgical excision procedure        Objective   Visit Vitals  /71   Wt 59.6 kg (131 lb 6 oz)   LMP 2024 (Exact Date)   BMI 23.27 kg/m²   OB Status Pregnant   Smoking Status Never   BSA 1.63 m²      Physical Exam  Weight: 59.6 kg (131 lb 6 oz)  TW.892 kg (6 lb 6 oz)   Expected Total Weight Gain: 11.5 kg (25 lb)-16 kg (35 lb)   Pregravid BMI: 22.15  BP: 106/71    Review of Systems     Postpartum Depression: Low Risk  (2025)    [place]  Depression Scale     Last EPDS Total Score: 3     Last EPDS Self Harm Result: Never          Assessment   34 y.o.  at 8w4d     Problem List Items Addressed This Visit          Ob-Gyn Problems    8 weeks gestation of pregnancy ([facility])    Overview     Desired provider in labor: [] CNM  [] Physician   [] Either Acceptable  [x] Blood Products: [x] Yes, accepts [] No, needs counseling  [x] Initial BMI: 22.15   [] Prenatal Labs:   [] Cervical Cancer Screening up to date  [x] Rh status: negative  [] Screen for IPV and Substance Use Risk:  [] Genetic Screening (cfDNA):    [] First Trimester Anatomy Screen (11-13.6 wks):  [x] Baby ASA (initiated): Agrees to start 12 wks  [x] Pregnancy dated by: LMP c/w 1st tri U/S with ЕЛЕНА    [] Anatomy US: (19-20 wks)  [] Federal Sterilization consent signed (if indicated):  [] 1hr GCT at 24-28wks:  [] Needs Rhogam   [] Fetal Surveillance (if indicated):  [] Tdap (27-32 wks, may be given up to 36 wks if initial window missed):   [] Flu vaccine: 2025, plans covid booster    [] Feeding Intentions:  [] Postpartum Birth control method:   [] GBS at 36 - 37 wks:  [] 39 weeks discussion of IOL vs. Expectant management:  [] Mode of delivery ( anticipated ):              Other    Moderate persistent asthma - Primary    Migraine headache    Overview     Stopped taking Trileptol prior to pregnancy  Sees a neurologist  Started Magnesium supplement         Cystic fibrosis gene carrier    Overview     Told has gene at [facility]  Partner tested negative         Anxiety    Overview     60 mg Prozac daily  Therapist monthly          Other Visit Diagnoses       Prenatal care in first trimester ([facility])        Relevant Orders    POCT pregnancy,  urine manually resulted (Completed)    Urine Culture    C. trachomatis / N. gonorrhoeae, Amplified    US MAC OB imaging order            Plan   - New OB resources provided and reviewed with particular attention to dietary, travel, and medication restrictions  - Oriented to practice, CNM vs. MD care, desires CNM care  - Reviewed IOM recommendations for weight gain given pt's BMI: 25-35 pounds (BMI 18.5 - 24.9)  - Reviewed bleeding precautions, warning signs, when to call provider; phone number provided  - Discussed bASA for PEC prophylaxis, agrees to start at 12 wks  - Routine NOB labs to be ordered at next visit  - Additional labs added: hgb A1C  - NIPT discussed with patient: patient desires. Pre-test genetic counseling discussed and included:   Interpretation of family and medical histories to assess the probability of disease occurrence or recurrence;   Education about inheritance, genetic testing, disease management, prevention and resources  Counseling to promote informed choices and adaptation to the risk or presented of a genetic condition  Counseling for psychological aspects of genetic testing.  - Nuchal Translucency ultrasound ordered  - Return in 4 weeks for routine prenatal care  -  Discussed compromised immune system in pregnancy and increased risk of delilah illness, especially URIs like Flu, RSV, & Covid. Explained greater risk of complications associated with illnesses and higher incidence of hospitalization. Pt agrees Flu, Covid, and TDAP vaccines.         SANJANA Ford-LENIM

## 2025-01-24 DIAGNOSIS — N97.9 FEMALE INFERTILITY: ICD-10-CM

## 2025-01-24 LAB
BACTERIA UR CULT: NORMAL
C TRACH RRNA SPEC QL NAA+PROBE: NEGATIVE
N GONORRHOEA DNA SPEC QL PROBE+SIG AMP: NEGATIVE

## 2025-01-24 RX ORDER — PROGESTERONE 100 MG/1
100 CAPSULE ORAL 2 TIMES DAILY
Qty: 60 CAPSULE | Refills: 0 | Status: SHIPPED | OUTPATIENT
Start: 2025-01-24 | End: 2026-01-24

## 2025-02-16 DIAGNOSIS — N97.9 FEMALE INFERTILITY: ICD-10-CM

## 2025-02-17 RX ORDER — PROGESTERONE 100 MG/1
100 CAPSULE ORAL 2 TIMES DAILY
Qty: 180 CAPSULE | Refills: 3 | Status: SHIPPED | OUTPATIENT
Start: 2025-02-17 | End: 2026-02-17

## 2025-02-27 ENCOUNTER — APPOINTMENT (OUTPATIENT)
Dept: OBSTETRICS AND GYNECOLOGY | Facility: CLINIC | Age: 35
End: 2025-02-27
Payer: COMMERCIAL

## 2025-02-27 ENCOUNTER — LAB (OUTPATIENT)
Dept: LAB | Facility: HOSPITAL | Age: 35
End: 2025-02-27
Payer: COMMERCIAL

## 2025-02-27 VITALS — SYSTOLIC BLOOD PRESSURE: 133 MMHG | DIASTOLIC BLOOD PRESSURE: 71 MMHG | BODY MASS INDEX: 23.65 KG/M2 | WEIGHT: 133.5 LBS

## 2025-02-27 DIAGNOSIS — Z3A.13 13 WEEKS GESTATION OF PREGNANCY (HHS-HCC): Primary | ICD-10-CM

## 2025-02-27 DIAGNOSIS — Z3A.13 13 WEEKS GESTATION OF PREGNANCY (HHS-HCC): ICD-10-CM

## 2025-02-27 DIAGNOSIS — M54.50 ACUTE RIGHT-SIDED LOW BACK PAIN WITHOUT SCIATICA: Primary | ICD-10-CM

## 2025-02-27 LAB
ABO GROUP (TYPE) IN BLOOD: NORMAL
ANTIBODY SCREEN: NORMAL
ERYTHROCYTE [DISTWIDTH] IN BLOOD BY AUTOMATED COUNT: 12 % (ref 11.5–14.5)
HCT VFR BLD AUTO: 36.2 % (ref 36–46)
HGB BLD-MCNC: 12.4 G/DL (ref 12–16)
MCH RBC QN AUTO: 31.5 PG (ref 26–34)
MCHC RBC AUTO-ENTMCNC: 34.3 G/DL (ref 32–36)
MCV RBC AUTO: 92 FL (ref 80–100)
NRBC BLD-RTO: 0 /100 WBCS (ref 0–0)
PLATELET # BLD AUTO: 268 X10*3/UL (ref 150–450)
RBC # BLD AUTO: 3.94 X10*6/UL (ref 4–5.2)
REFLEX ADDED, ANEMIA PANEL: NORMAL
RH FACTOR (ANTIGEN D): NORMAL
WBC # BLD AUTO: 10.3 X10*3/UL (ref 4.4–11.3)

## 2025-02-27 PROCEDURE — 86900 BLOOD TYPING SEROLOGIC ABO: CPT

## 2025-02-27 PROCEDURE — 0501F PRENATAL FLOW SHEET: CPT | Performed by: ADVANCED PRACTICE MIDWIFE

## 2025-02-27 PROCEDURE — 85027 COMPLETE CBC AUTOMATED: CPT

## 2025-02-27 PROCEDURE — 86850 RBC ANTIBODY SCREEN: CPT

## 2025-02-27 PROCEDURE — 86901 BLOOD TYPING SEROLOGIC RH(D): CPT

## 2025-02-27 NOTE — PROGRESS NOTES
Ob Visit  25     SUBJECTIVE    HPI: Charmayne Chiracu is a 34 y.o.  at 13w4d here for Return OB visit.  Charmayne reports feeling well, no OB concerns. Reports some right lower back pain, not sure if she pulled a muscle?  Referral to PFPT, pt to schedule. Continue Tylenol, heat, massage  OB labs with NIPTs today  Repeat urine cx as 1st one was contaminated.       OBJECTIVE  Visit Vitals  /71   Wt 60.6 kg (133 lb 8 oz)   LMP 2024 (Exact Date)   BMI 23.65 kg/m²   OB Status Pregnant   Smoking Status Never   BSA 1.64 m²            ASSESSMENT & PLAN    Charmayne Chiracu is a 34 y.o.  at 13w4d here for the following concerns we addressed today:    Problem List Items Addressed This Visit          Ob-Gyn Problems    13 weeks gestation of pregnancy ([facility])    Overview     Desired provider in labor: [] CNM  [] Physician   [] Either Acceptable  [x] Blood Products: [x] Yes, accepts [] No, needs counseling  [x] Initial BMI: 22.15   [] Prenatal Labs:   [x] Cervical Cancer Screening up to date: 2024  NILM/HPV(-)  [x] Rh status: negative  [x] Screen for IPV and Substance Use Risk:  [] Genetic Screening (cfDNA):  NIPs  [x] First Trimester Anatomy Screen (11-13.6 wks): Declines  [x] Baby ASA (initiated): Agrees to start 12 wks  [x] Pregnancy dated by: LMP c/w 1st tri U/S with ЕЛЕНА    [] Anatomy US: (19-20 wks)  [] Federal Sterilization consent signed (if indicated):  [] 1hr GCT at 24-28wks:  [] Needs Rhogam   [] Fetal Surveillance (if indicated):  [] Tdap (27-32 wks, may be given up to 36 wks if initial window missed):   [x] Flu vaccine: 2025, plans covid booster    [] Feeding Intentions:  [] Postpartum Birth control method:   [] GBS at 36 - 37 wks:  [] 39 weeks discussion of IOL vs. Expectant management:  [] Mode of delivery ( anticipated ):           Relevant Orders    Urine Culture    CBC Anemia Panel With Reflex,Pregnancy    Hemoglobin A1C    Hepatitis B surface Ag    Hepatitis C Antibody     HIV 1/2 Antigen/Antibody Screen with Reflex to Confirmation    Rubella IgG    Syphilis Screen with Reflex    Type And Screen Is this order related to pregnancy or an upcoming surgery? Yes; Where will this surgery/delivery be performed? [facility]; What is the date of the surgery? 8/31/2025 (POOJA); Has this patient ever had a transfusion? Unkn...    Myriad Prequel Prenatal Screen     Other Visit Diagnoses       Acute right-sided low back pain without sciatica    -  Primary    Relevant Orders    Referral to Physical Therapy              Return to care in 4 weeks or sooner as needed      Padmini Frias, SANJANA-ISMA

## 2025-03-01 LAB — BACTERIA UR CULT: NORMAL

## 2025-03-02 LAB
EST. AVERAGE GLUCOSE BLD GHB EST-MCNC: 100 MG/DL
EST. AVERAGE GLUCOSE BLD GHB EST-SCNC: 5.5 MMOL/L
HBA1C MFR BLD: 5.1 % OF TOTAL HGB
HBV SURFACE AG SERPL QL IA: NORMAL
HCV AB SERPL QL IA: NORMAL
HIV 1+2 AB+HIV1 P24 AG SERPL QL IA: NORMAL
RUBV IGG SERPL IA-ACNC: 1.7 INDEX
T PALLIDUM AB SER QL IA: NEGATIVE

## 2025-03-06 ENCOUNTER — TELEPHONE (OUTPATIENT)
Dept: OBSTETRICS AND GYNECOLOGY | Facility: CLINIC | Age: 35
End: 2025-03-06
Payer: COMMERCIAL

## 2025-03-06 NOTE — TELEPHONE ENCOUNTER
14.4 wk ob left message on ob line c/o possible staph infection on her face and would like to know if something can be prescribed or what she should do    Left message advising patient that she will need to get in with either her PCP, Derm or go to Urgent Care for evaluation.    If needed, antibiotics are ok to take in pregnancy as long as they are warranted.    Message forwarded to Padmini to make her aware

## 2025-03-20 ENCOUNTER — APPOINTMENT (OUTPATIENT)
Dept: PRIMARY CARE | Facility: CLINIC | Age: 35
End: 2025-03-20
Payer: COMMERCIAL

## 2025-03-20 VITALS
BODY MASS INDEX: 23.74 KG/M2 | HEIGHT: 63 IN | SYSTOLIC BLOOD PRESSURE: 112 MMHG | DIASTOLIC BLOOD PRESSURE: 64 MMHG | OXYGEN SATURATION: 96 % | HEART RATE: 86 BPM | WEIGHT: 134 LBS | RESPIRATION RATE: 16 BRPM

## 2025-03-20 DIAGNOSIS — F90.0 ATTENTION DEFICIT HYPERACTIVITY DISORDER (ADHD), PREDOMINANTLY INATTENTIVE TYPE: ICD-10-CM

## 2025-03-20 DIAGNOSIS — Z13.29 SCREENING FOR THYROID DISORDER: ICD-10-CM

## 2025-03-20 DIAGNOSIS — Z13.220 SCREENING FOR LIPID DISORDERS: ICD-10-CM

## 2025-03-20 DIAGNOSIS — G43.809 OTHER MIGRAINE WITHOUT STATUS MIGRAINOSUS, NOT INTRACTABLE: Primary | ICD-10-CM

## 2025-03-20 DIAGNOSIS — F41.9 ANXIETY: ICD-10-CM

## 2025-03-20 DIAGNOSIS — J45.50 SEVERE PERSISTENT ASTHMA WITHOUT COMPLICATION (MULTI): ICD-10-CM

## 2025-03-20 DIAGNOSIS — J45.40 MODERATE PERSISTENT ASTHMA WITHOUT COMPLICATION (HHS-HCC): ICD-10-CM

## 2025-03-20 DIAGNOSIS — J45.30 MILD PERSISTENT ASTHMA WITHOUT COMPLICATION (HHS-HCC): ICD-10-CM

## 2025-03-20 LAB — COMMENTS - MP RESULT TYPE: NORMAL

## 2025-03-20 PROCEDURE — 99385 PREV VISIT NEW AGE 18-39: CPT | Performed by: NURSE PRACTITIONER

## 2025-03-20 PROCEDURE — 1036F TOBACCO NON-USER: CPT | Performed by: NURSE PRACTITIONER

## 2025-03-20 PROCEDURE — 3008F BODY MASS INDEX DOCD: CPT | Performed by: NURSE PRACTITIONER

## 2025-03-20 RX ORDER — FLUTICASONE PROPIONATE AND SALMETEROL 500; 50 UG/1; UG/1
1 POWDER RESPIRATORY (INHALATION)
Qty: 60 EACH | Refills: 11 | Status: SHIPPED | OUTPATIENT
Start: 2025-03-20 | End: 2026-03-20

## 2025-03-20 RX ORDER — ALBUTEROL SULFATE 90 UG/1
2 INHALANT RESPIRATORY (INHALATION) EVERY 4 HOURS PRN
Qty: 51 G | Refills: 3 | Status: SHIPPED | OUTPATIENT
Start: 2025-03-20 | End: 2025-04-19

## 2025-03-20 RX ORDER — DEXTROAMPHETAMINE SACCHARATE, AMPHETAMINE ASPARTATE, DEXTROAMPHETAMINE SULFATE AND AMPHETAMINE SULFATE 1.25; 1.25; 1.25; 1.25 MG/1; MG/1; MG/1; MG/1
5 TABLET ORAL 2 TIMES DAILY
COMMUNITY
Start: 2025-03-17

## 2025-03-20 RX ORDER — ASPIRIN 81 MG/1
81 TABLET ORAL DAILY
COMMUNITY

## 2025-03-20 ASSESSMENT — ENCOUNTER SYMPTOMS
WHEEZING: 0
NAUSEA: 0
COUGH: 0
NERVOUS/ANXIOUS: 0
SLEEP DISTURBANCE: 0
DIARRHEA: 0
LIGHT-HEADEDNESS: 0
FEVER: 0
APPETITE CHANGE: 0
CHILLS: 0
WEAKNESS: 0
NUMBNESS: 0
HEADACHES: 0
PALPITATIONS: 0
CONSTIPATION: 0
SHORTNESS OF BREATH: 0
DYSPHORIC MOOD: 0
BLOOD IN STOOL: 0
ABDOMINAL PAIN: 0
FATIGUE: 0
VOMITING: 0
UNEXPECTED WEIGHT CHANGE: 0

## 2025-03-20 ASSESSMENT — PATIENT HEALTH QUESTIONNAIRE - PHQ9
10. IF YOU CHECKED OFF ANY PROBLEMS, HOW DIFFICULT HAVE THESE PROBLEMS MADE IT FOR YOU TO DO YOUR WORK, TAKE CARE OF THINGS AT HOME, OR GET ALONG WITH OTHER PEOPLE: SOMEWHAT DIFFICULT
2. FEELING DOWN, DEPRESSED OR HOPELESS: SEVERAL DAYS
1. LITTLE INTEREST OR PLEASURE IN DOING THINGS: SEVERAL DAYS
SUM OF ALL RESPONSES TO PHQ9 QUESTIONS 1 AND 2: 2

## 2025-03-20 NOTE — PROGRESS NOTES
Subjective   Charmayne Chiracu is a 34 y.o. female who presents for Establish Care and Annual Exam.    Patient reported health as : Good  Regular dental visists : Yes   Regular eye exams : Yes   Hearing loss : No   Well balanced diet : Yes   Weight Concerns : No   Exercise : Regular  Caffeine Use : Minimal use  Alcohol Use : No    HPI  Review of Systems   Constitutional:  Negative for appetite change, chills, fatigue, fever and unexpected weight change.   Respiratory:  Negative for cough, shortness of breath and wheezing.    Cardiovascular:  Negative for chest pain, palpitations and leg swelling.   Gastrointestinal:  Negative for abdominal pain, blood in stool, constipation, diarrhea, nausea and vomiting.   Neurological:  Negative for weakness, light-headedness, numbness and headaches.   Psychiatric/Behavioral:  Negative for dysphoric mood and sleep disturbance. The patient is not nervous/anxious.        Objective     Physical Exam  Vitals reviewed.   Constitutional:       General: She is not in acute distress.     Appearance: Normal appearance. She is not ill-appearing.   HENT:      Head: Normocephalic.   Eyes:      Conjunctiva/sclera: Conjunctivae normal.   Neck:      Thyroid: No thyroid mass, thyromegaly or thyroid tenderness.   Cardiovascular:      Rate and Rhythm: Normal rate and regular rhythm.      Pulses: Normal pulses.      Heart sounds: No murmur heard.  Pulmonary:      Effort: Pulmonary effort is normal.      Breath sounds: Normal breath sounds.   Abdominal:      General: Bowel sounds are normal.      Palpations: Abdomen is soft.   Musculoskeletal:      Cervical back: Neck supple.      Right lower leg: No edema.      Left lower leg: No edema.   Lymphadenopathy:      Cervical: No cervical adenopathy.   Skin:     General: Skin is warm and dry.   Neurological:      General: No focal deficit present.      Mental Status: She is alert and oriented to person, place, and time.   Psychiatric:         Mood and  "Affect: Mood normal.         Thought Content: Thought content normal.       /64   Pulse 86   Resp 16   Ht 1.6 m (5' 3\")   Wt 60.8 kg (134 lb)   LMP 11/24/2024 (Exact Date)   SpO2 96%   BMI 23.74 kg/m²     Assessment/Plan     Problem List Items Addressed This Visit       Moderate persistent asthma    Migraine headache - Primary    Overview     Stopped taking Trileptol prior to pregnancy  Sees a neurologist  Started Magnesium supplement         Anxiety    Overview     60 mg Prozac daily  Therapist monthly         Attention deficit hyperactivity disorder (ADHD), predominantly inattentive type     Other Visit Diagnoses       Severe persistent asthma without complication (Multi)        Relevant Medications    fluticasone propion-salmeteroL (Advair Diskus) 500-50 mcg/dose diskus inhaler    Mild persistent asthma without complication ([facility]HS-HCC)        Relevant Medications    albuterol 90 mcg/actuation inhaler    Screening for lipid disorders        Relevant Orders    Lipid panel    Screening for thyroid disorder        Relevant Orders    TSH with reflex to Free T4 if abnormal            "

## 2025-03-27 ENCOUNTER — APPOINTMENT (OUTPATIENT)
Dept: OBSTETRICS AND GYNECOLOGY | Facility: CLINIC | Age: 35
End: 2025-03-27
Payer: COMMERCIAL

## 2025-03-27 VITALS — DIASTOLIC BLOOD PRESSURE: 72 MMHG | WEIGHT: 135.6 LBS | BODY MASS INDEX: 24.02 KG/M2 | SYSTOLIC BLOOD PRESSURE: 106 MMHG

## 2025-03-27 DIAGNOSIS — Z34.91 PRENATAL CARE IN FIRST TRIMESTER: Primary | ICD-10-CM

## 2025-03-27 DIAGNOSIS — Z3A.17 17 WEEKS GESTATION OF PREGNANCY (HHS-HCC): ICD-10-CM

## 2025-03-27 PROCEDURE — 0501F PRENATAL FLOW SHEET: CPT | Performed by: ADVANCED PRACTICE MIDWIFE

## 2025-03-27 NOTE — PROGRESS NOTES
Ob Visit  25     SUBJECTIVE    HPI: Charmayne Chiracu is a 34 y.o.  at 17w4d here for Return OB visit.  Charmayne reports feeling well, no concerns, just feeling anxious r/t 2 prior losses.   Encouraged to schedule anatomy U/S  OB labs and NIPTs reviewed, WNLs     OBJECTIVE  Visit Vitals  /72   Wt 61.5 kg (135 lb 9.6 oz)   LMP 2024 (Exact Date)   BMI 24.02 kg/m²   OB Status Pregnant   Smoking Status Never   BSA 1.65 m²            ASSESSMENT & PLAN    Charmayne Chiracu is a 34 y.o.  at 17w4d here for the following concerns we addressed today:    Problem List Items Addressed This Visit          Ob-Gyn Problems    17 weeks gestation of pregnancy ([facility])    Overview     Desired provider in labor: [x] CNM  [] Physician   [] Either Acceptable  [x] Blood Products: [x] Yes, accepts [] No, needs counseling  [x] Initial BMI: 22.15   [x] Prenatal Labs:   [x] Cervical Cancer Screening up to date: 2024  NILM/HPV(-)   [x] Rh status: NEGATIVE  [x] Screen for IPV and Substance Use Risk:  [x] Genetic Screening (cfDNA):  RR NIPTs  [x] First Trimester Anatomy Screen (11-13.6 wks): Declined  [x] Baby ASA (initiated): Yes  [x] Pregnancy dated by: LMP c/w 1st tri U/S with ЕЛЕНА     [] Anatomy US: (19-20 wks)  [] Federal Sterilization consent signed (if indicated):  [] 1hr GCT at 24-28wks:  [] Rhogam (if indicated):   [] Fetal Surveillance (if indicated):  [] Tdap (27-32 wks, may be given up to 36 wks if initial window missed):   [] RSV (32-36 wks) (Sept. to end of ):   [x] Flu vaccine: 2025, plans covid booster       [] Feeding Intentions:  [] Postpartum Birth control method:   [] GBS at 36 - 37 wks:  [] 39 weeks discussion of IOL vs. Expectant management:  [] Mode of delivery ( anticipated ):            Other Visit Diagnoses       Prenatal care in first trimester ([facility])    -  Primary    Relevant Orders    US MAC OB imaging order              Return to care in 4 weeks or sooner as  needed      ELEUTERIO Ford

## 2025-03-28 LAB
CHOLEST SERPL-MCNC: 235 MG/DL
CHOLEST/HDLC SERPL: 2.9 (CALC)
HDLC SERPL-MCNC: 80 MG/DL
LDLC SERPL CALC-MCNC: 123 MG/DL (CALC)
NONHDLC SERPL-MCNC: 155 MG/DL (CALC)
TRIGL SERPL-MCNC: 201 MG/DL
TSH SERPL-ACNC: 1.45 MIU/L

## 2025-03-30 PROBLEM — E78.2 MIXED HYPERLIPIDEMIA: Status: ACTIVE | Noted: 2025-03-30

## 2025-04-07 ENCOUNTER — APPOINTMENT (OUTPATIENT)
Dept: DERMATOLOGY | Facility: CLINIC | Age: 35
End: 2025-04-07
Payer: COMMERCIAL

## 2025-04-09 ENCOUNTER — HOSPITAL ENCOUNTER (OUTPATIENT)
Dept: RADIOLOGY | Facility: CLINIC | Age: 35
Discharge: HOME | End: 2025-04-09
Payer: COMMERCIAL

## 2025-04-09 DIAGNOSIS — Z34.91 PRENATAL CARE IN FIRST TRIMESTER: ICD-10-CM

## 2025-04-09 PROCEDURE — 76817 TRANSVAGINAL US OBSTETRIC: CPT

## 2025-04-09 PROCEDURE — 76811 OB US DETAILED SNGL FETUS: CPT

## 2025-04-11 PROBLEM — O44.40 LOW LYING PLACENTA, ANTEPARTUM (HHS-HCC): Status: ACTIVE | Noted: 2025-04-11

## 2025-04-17 DIAGNOSIS — F41.1 GENERALIZED ANXIETY DISORDER: ICD-10-CM

## 2025-04-21 DIAGNOSIS — F41.9 ANXIETY: Primary | ICD-10-CM

## 2025-04-21 RX ORDER — FLUOXETINE HYDROCHLORIDE 60 MG/1
60 TABLET, FILM COATED ORAL; ORAL DAILY
Qty: 90 TABLET | Refills: 3 | OUTPATIENT
Start: 2025-04-21

## 2025-04-21 RX ORDER — FLUOXETINE HYDROCHLORIDE 60 MG/1
60 TABLET, FILM COATED ORAL; ORAL DAILY
Qty: 90 TABLET | Refills: 3 | Status: SHIPPED | OUTPATIENT
Start: 2025-04-21 | End: 2026-04-21

## 2025-04-24 ENCOUNTER — APPOINTMENT (OUTPATIENT)
Dept: OBSTETRICS AND GYNECOLOGY | Facility: CLINIC | Age: 35
End: 2025-04-24
Payer: COMMERCIAL

## 2025-05-01 ENCOUNTER — APPOINTMENT (OUTPATIENT)
Facility: CLINIC | Age: 35
End: 2025-05-01
Payer: COMMERCIAL

## 2025-05-01 VITALS — DIASTOLIC BLOOD PRESSURE: 75 MMHG | BODY MASS INDEX: 24.48 KG/M2 | WEIGHT: 138.2 LBS | SYSTOLIC BLOOD PRESSURE: 103 MMHG

## 2025-05-01 DIAGNOSIS — Z3A.22 22 WEEKS GESTATION OF PREGNANCY (HHS-HCC): ICD-10-CM

## 2025-05-01 DIAGNOSIS — Z34.02 PRENATAL CARE, FIRST PREGNANCY IN SECOND TRIMESTER: Primary | ICD-10-CM

## 2025-05-01 PROCEDURE — 0501F PRENATAL FLOW SHEET: CPT

## 2025-05-01 NOTE — PROGRESS NOTES
Assessment/Plan   34 y.o.  at 22w4d  - reviewed results of anatomy scan; wnl with exception of low lying placenta  - discussed GCT/CBC/T&S to be performed between 24-28 weeks gestation; orders placed  - Routine prenatal care    Follow up in 4 weeks for next prenatal visit. Encourage completion of labs if not yet done.     ELEUTERIO Brown     Charmayne Chiracu is a 34 y.o.  at 22w4d with a working estimated date of delivery of 2025, by Last Menstrual Period presenting for a routine prenatal visit. She is doing well, no concerns. She denies vaginal bleeding, leakage of fluid, contractions, or decreased fetal movement.    Her pregnancy is complicated by:  Pregnancy Problems (from 25 to present)       Problem Noted Diagnosed Resolved    Low lying placenta, antepartum ([facility]) 2025 by ELEUTERIO Ford  No    Priority:  Medium       Overview Signed 2025 11:08 AM by ELEUTERIO Ford   Placenta is anterior and low-lying, measuring 5 mm away from the internal cervical os   Repeat scan at 30 weeks         22 weeks gestation of pregnancy ([facility]) 3/27/2025 by ELEUTERIO Ford  No    Priority:  Medium       Overview Addendum 2025 11:11 AM by LEEUTERIO Ford   Desired provider in labor: [x] CNM  [] Physician   [] Either Acceptable  [x] Blood Products: [x] Yes, accepts [] No, needs counseling  [x] Initial BMI: 22.15   [x] Prenatal Labs: WNLs  [x] Cervical Cancer Screening up to date: 2024  NILM/HPV(-)   [x] Rh status: NEGATIVE  [x] Screen for IPV and Substance Use Risk:  [x] Genetic Screening (cfDNA):  RR NIPTs  [x] First Trimester Anatomy Screen (11-13.6 wks): Declined  [x] Baby ASA (initiated): Yes  [x] Pregnancy dated by: LMP c/w 1st tri U/S with ЕЛЕНА     [x] Anatomy US: (19-20 wks): WNLs, placenta anterior & low lying  []  Repeat U/S at 30 weeks - placenta location  [] Federal Sterilization consent signed (if  indicated):  [] 1hr GCT at 24-28wks:  [] Rhogam:   [] Fetal Surveillance (if indicated):  [] Tdap (27-32 wks, may be given up to 36 wks if initial window missed):   [] RSV (32-36 wks) (Sept. to end of ):   [x] Flu vaccine: 2025, plans covid booster       [] Feeding Intentions:  [] Postpartum Birth control method:   [] GBS at 36 - 37 wks:  [] 39 weeks discussion of IOL vs. Expectant management:  [] Mode of delivery ( anticipated ):           Cystic fibrosis gene carrier 12/10/2019 by ELEUTERIO Ford  No    Priority:  Medium       Overview Addendum 3/20/2025  3:03 PM by FEMI Anderson   Told has gene at f  Partner tested negative  Patient was initially diagnosed w/ CF at age 15, but during genetic testing was found to NOT have it, but is a carrier.                   Objective   Weight: 62.7 kg (138 lb 3.2 oz)  TW.987 kg (13 lb 3.2 oz)   Expected Total Weight Gain: 11.5 kg (25 lb)-16 kg (35 lb)   Pregravid BMI: 22.15  Pregravid Weight: 56.7 kg (125 lb)   BP: 103/75  Fetal Heart Rate: 144 Fundal Height (cm): 22 cm

## 2025-05-22 ENCOUNTER — APPOINTMENT (OUTPATIENT)
Dept: OBSTETRICS AND GYNECOLOGY | Facility: CLINIC | Age: 35
End: 2025-05-22
Payer: COMMERCIAL

## 2025-05-22 VITALS — WEIGHT: 147 LBS | SYSTOLIC BLOOD PRESSURE: 109 MMHG | DIASTOLIC BLOOD PRESSURE: 70 MMHG | BODY MASS INDEX: 26.04 KG/M2

## 2025-05-22 DIAGNOSIS — B00.1 COLD SORE: Primary | ICD-10-CM

## 2025-05-22 DIAGNOSIS — Z3A.25 25 WEEKS GESTATION OF PREGNANCY (HHS-HCC): ICD-10-CM

## 2025-05-22 DIAGNOSIS — O44.40 LOW LYING PLACENTA, ANTEPARTUM (HHS-HCC): ICD-10-CM

## 2025-05-22 PROCEDURE — 0501F PRENATAL FLOW SHEET: CPT | Performed by: ADVANCED PRACTICE MIDWIFE

## 2025-05-22 RX ORDER — VALACYCLOVIR HYDROCHLORIDE 1 G/1
1000 TABLET, FILM COATED ORAL 2 TIMES DAILY
Qty: 14 TABLET | Refills: 0 | Status: SHIPPED | OUTPATIENT
Start: 2025-05-22 | End: 2025-05-29

## 2025-05-22 NOTE — PROGRESS NOTES
Ob Visit  25     SUBJECTIVE    HPI: Charmayne Chiracu is a 34 y.o.  at 25w4d here for Return OB visit.  Charmayne reports feeling fetal movement, no OB concerns.  C/O cold sore on her lip, wants to know if there is anything she can do for it, discussed Valtrex, pt agrees to start  Reviewed breast pump instructions, encouraged to call insurance company for specifics.   Anatomy U/S WNLs, anterior low-lying placenta, repeat U/S at 30 weeks  Planning 1 hr and CBC after         OBJECTIVE  Visit Vitals  /70   Wt 66.7 kg (147 lb)   LMP 2024 (Exact Date)   BMI 26.04 kg/m²   OB Status Pregnant   Smoking Status Never   BSA 1.72 m²            ASSESSMENT & PLAN    Charmayne Chiracu is a 34 y.o.  at 25w4d here for the following concerns we addressed today:    Problem List Items Addressed This Visit          Ob-Gyn Problems    Low lying placenta, antepartum ([facility])    Overview   Placenta is anterior and low-lying, measuring 5 mm away from the internal cervical os   Repeat scan at 30 weeks         25 weeks gestation of pregnancy ([facility])    Overview   Desired provider in labor: [x] CNM  [] Physician   [] Either Acceptable  [x] Blood Products: [x] Yes, accepts [] No, needs counseling  [x] Initial BMI: 22.15   [x] Prenatal Labs: WNLs  [x] Cervical Cancer Screening up to date: 2024  NILM/HPV(-)   [x] Rh status: NEGATIVE  [x] Screen for IPV and Substance Use Risk:  [x] Genetic Screening (cfDNA):  RR NIPTs  [x] First Trimester Anatomy Screen (11-13.6 wks): Declined  [x] Baby ASA (initiated): Yes  [x] Pregnancy dated by: LMP c/w 1st tri U/S with ЕЛЕНА     [x] Anatomy US: (19-20 wks): WNLs, placenta anterior & low lying  []  Repeat U/S at 30 weeks - placenta location  [] Federal Sterilization consent signed (if indicated):  [] 1hr GCT at 24-28wks:  [] Rhogam:   [] Fetal Surveillance (if indicated):  [] Tdap (27-32 wks, may be given up to 36 wks if initial window missed):   [] RSV (32-36 wks) (Sept.  to end of Jan):   [x] Flu vaccine: 1/2025, plans covid booster       [] Feeding Intentions:  [] Postpartum Birth control method:   [] GBS at 36 - 37 wks:  [] 39 weeks discussion of IOL vs. Expectant management:  [] Mode of delivery ( anticipated ):            Other Visit Diagnoses         Cold sore    -  Primary    Relevant Medications    valACYclovir (Valtrex) 1 gram tablet              Return to care in 4 weeks or sooner as needed      Padmini Frias, SANJANA-LENIM

## 2025-06-09 ENCOUNTER — LAB (OUTPATIENT)
Dept: LAB | Facility: HOSPITAL | Age: 35
End: 2025-06-09
Payer: COMMERCIAL

## 2025-06-09 LAB
ABO GROUP (TYPE) IN BLOOD: NORMAL
ANTIBODY SCREEN: NORMAL
ERYTHROCYTE [DISTWIDTH] IN BLOOD BY AUTOMATED COUNT: 13.6 % (ref 11.5–14.5)
HCT VFR BLD AUTO: 34.1 % (ref 36–46)
HGB BLD-MCNC: 11.6 G/DL (ref 12–16)
MCH RBC QN AUTO: 33.4 PG (ref 26–34)
MCHC RBC AUTO-ENTMCNC: 34 G/DL (ref 32–36)
MCV RBC AUTO: 98 FL (ref 80–100)
NRBC BLD-RTO: 0 /100 WBCS (ref 0–0)
PLATELET # BLD AUTO: 217 X10*3/UL (ref 150–450)
RBC # BLD AUTO: 3.47 X10*6/UL (ref 4–5.2)
RH FACTOR (ANTIGEN D): NORMAL
WBC # BLD AUTO: 11.5 X10*3/UL (ref 4.4–11.3)

## 2025-06-09 PROCEDURE — 86900 BLOOD TYPING SEROLOGIC ABO: CPT

## 2025-06-09 PROCEDURE — 85027 COMPLETE CBC AUTOMATED: CPT

## 2025-06-09 PROCEDURE — 86850 RBC ANTIBODY SCREEN: CPT

## 2025-06-09 PROCEDURE — 86901 BLOOD TYPING SEROLOGIC RH(D): CPT

## 2025-06-10 LAB — REFLEX ADDED, ANEMIA PANEL: NORMAL

## 2025-06-11 LAB
GLUCOSE 1H P 50 G GLC PO SERPL-MCNC: 129 MG/DL
T PALLIDUM AB SER QL IA: NEGATIVE

## 2025-06-19 ENCOUNTER — APPOINTMENT (OUTPATIENT)
Dept: OBSTETRICS AND GYNECOLOGY | Facility: CLINIC | Age: 35
End: 2025-06-19
Payer: COMMERCIAL

## 2025-06-23 ENCOUNTER — HOSPITAL ENCOUNTER (OUTPATIENT)
Dept: RADIOLOGY | Facility: CLINIC | Age: 35
Discharge: HOME | End: 2025-06-23
Payer: COMMERCIAL

## 2025-06-23 DIAGNOSIS — Z34.91 PRENATAL CARE IN FIRST TRIMESTER: ICD-10-CM

## 2025-06-23 DIAGNOSIS — O44.43 LOW-LYING PLACENTA IN THIRD TRIMESTER (HHS-HCC): ICD-10-CM

## 2025-06-23 PROBLEM — O09.523 MULTIGRAVIDA OF ADVANCED MATERNAL AGE IN THIRD TRIMESTER (HHS-HCC): Status: ACTIVE | Noted: 2025-06-23

## 2025-06-23 PROBLEM — O44.40 LOW LYING PLACENTA, ANTEPARTUM (HHS-HCC): Status: RESOLVED | Noted: 2025-04-11 | Resolved: 2025-06-23

## 2025-06-23 PROCEDURE — 76816 OB US FOLLOW-UP PER FETUS: CPT | Performed by: MEDICAL GENETICS

## 2025-06-23 PROCEDURE — 76817 TRANSVAGINAL US OBSTETRIC: CPT | Performed by: MEDICAL GENETICS

## 2025-06-23 PROCEDURE — 76817 TRANSVAGINAL US OBSTETRIC: CPT

## 2025-06-23 PROCEDURE — 76816 OB US FOLLOW-UP PER FETUS: CPT

## 2025-06-24 ENCOUNTER — APPOINTMENT (OUTPATIENT)
Dept: OBSTETRICS AND GYNECOLOGY | Facility: CLINIC | Age: 35
End: 2025-06-24
Payer: COMMERCIAL

## 2025-06-24 VITALS — SYSTOLIC BLOOD PRESSURE: 112 MMHG | WEIGHT: 149 LBS | DIASTOLIC BLOOD PRESSURE: 70 MMHG | BODY MASS INDEX: 26.39 KG/M2

## 2025-06-24 DIAGNOSIS — Z29.13 NEED FOR RHOGAM DUE TO RH NEGATIVE MOTHER: ICD-10-CM

## 2025-06-24 DIAGNOSIS — O21.9 NAUSEA AND VOMITING IN PREGNANCY PRIOR TO 22 WEEKS GESTATION: ICD-10-CM

## 2025-06-24 DIAGNOSIS — R11.2 NAUSEA AND VOMITING, UNSPECIFIED VOMITING TYPE: Primary | ICD-10-CM

## 2025-06-24 DIAGNOSIS — F41.9 ANXIETY: ICD-10-CM

## 2025-06-24 DIAGNOSIS — O09.523 MULTIGRAVIDA OF ADVANCED MATERNAL AGE IN THIRD TRIMESTER (HHS-HCC): ICD-10-CM

## 2025-06-24 DIAGNOSIS — Z3A.30 30 WEEKS GESTATION OF PREGNANCY (HHS-HCC): ICD-10-CM

## 2025-06-24 PROCEDURE — 0501F PRENATAL FLOW SHEET: CPT | Performed by: ADVANCED PRACTICE MIDWIFE

## 2025-06-24 PROCEDURE — 86901 BLOOD TYPING SEROLOGIC RH(D): CPT

## 2025-06-24 PROCEDURE — 96372 THER/PROPH/DIAG INJ SC/IM: CPT | Performed by: ADVANCED PRACTICE MIDWIFE

## 2025-06-24 PROCEDURE — 86900 BLOOD TYPING SEROLOGIC ABO: CPT

## 2025-06-24 PROCEDURE — 86850 RBC ANTIBODY SCREEN: CPT

## 2025-06-24 RX ORDER — FAMOTIDINE 20 MG/1
20 TABLET, FILM COATED ORAL 2 TIMES DAILY
Qty: 90 TABLET | Refills: 3 | Status: SHIPPED | OUTPATIENT
Start: 2025-06-24 | End: 2026-06-24

## 2025-06-24 RX ORDER — ONDANSETRON 4 MG/1
4 TABLET, FILM COATED ORAL EVERY 8 HOURS PRN
Qty: 90 TABLET | Refills: 0 | Status: SHIPPED | OUTPATIENT
Start: 2025-06-24 | End: 2025-07-24

## 2025-06-24 RX ORDER — ONDANSETRON 4 MG/1
4 TABLET, ORALLY DISINTEGRATING ORAL EVERY 8 HOURS PRN
Qty: 20 TABLET | Refills: 3 | Status: SHIPPED | OUTPATIENT
Start: 2025-06-24 | End: 2025-08-23

## 2025-06-24 NOTE — PROGRESS NOTES
Ob Visit  25     SUBJECTIVE    HPI: Charmayne Chiracu is a 34 y.o.  at 30w2d here for Return OB visit.  Charmayne reports good fetal movement, no OB concerns.  RhoGAM and T&S today  Heartburn worse, Tums not helping as much, prescription for Pepcid sent to pharmacy  Reviewed birth preferences, encouraged to complete form         OBJECTIVE  Visit Vitals  /70   Wt 67.6 kg (149 lb)   LMP 2024 (Exact Date)   BMI 26.39 kg/m²   OB Status Pregnant   Smoking Status Never   BSA 1.73 m²            ASSESSMENT & PLAN    Charmayne Chiracu is a 34 y.o.  at 30w2d here for the following concerns we addressed today:    Problem List Items Addressed This Visit          Ob-Gyn Problems    Multigravida of advanced maternal age in third trimester ([facility])    Overview   Growth at 30 & 36 wks:  30 weeks: Appropriate fetal growth: 1642 g at the 61%          30 weeks gestation of pregnancy ([facility])    Overview   Desired provider in labor: [x] CNM  [] Physician   [] Either Acceptable  [x] Blood Products: [x] Yes, accepts [] No, needs counseling  [x] Initial BMI: 22.15   [x] Prenatal Labs: WNLs  [x] Cervical Cancer Screening up to date: 2024  NILM/HPV(-)   [x] Rh status: NEGATIVE  [x] Screen for IPV and Substance Use Risk:  [x] Genetic Screening (cfDNA):  RR NIPTs  [x] First Trimester Anatomy Screen (11-13.6 wks): Declined  [x] Baby ASA (initiated): Yes  [x] Pregnancy dated by: LMP c/w 1st tri U/S with ЕЛЕНА     [x] Anatomy US: (19-20 wks): WNLs, placenta anterior & low lying  []  Repeat U/S at 30 weeks - placenta location  [] Federal Sterilization consent signed (if indicated):  [x] 1hr GCT at 24-28wks: 129  [x] Rhogam: given 25  [] Fetal Surveillance (if indicated):  [] Tdap (27-32 wks, may be given up to 36 wks if initial window missed):   [] RSV (32-36 wks) (Sept. to end of ):   [x] Flu vaccine: 2025, plans covid booster       [x] Feeding Intentions: breast  [] Postpartum Birth control method:    [] GBS at 36 - 37 wks:  [] 39 weeks discussion of IOL vs. Expectant management:  [x] Mode of delivery ( anticipated ): vaginal              Other    Anxiety    Overview   60 mg Prozac daily  Therapist monthly          Other Visit Diagnoses         Nausea and vomiting, unspecified vomiting type    -  Primary    Relevant Medications    famotidine (Pepcid) 20 mg tablet    ondansetron (Zofran) 4 mg tablet      Need for rhogam due to Rh negative mother        Relevant Medications    rho(D) immune globulin (Rhogam) injection 300 mcg    Other Relevant Orders    Type And Screen Is this order related to pregnancy or an upcoming surgery? Yes; Where will this surgery/delivery be performed? [facility]; What is the date of the surgery? 8/31/2025 (POOJA); Has this patient ever had a transfusion? No; ...      Nausea and vomiting in pregnancy prior to 22 weeks gestation        Relevant Medications    ondansetron ODT (Zofran-ODT) 4 mg disintegrating tablet              Return to care in 2 weeks or sooner as needed      Padmini Frias, SANJANA-ISMA

## 2025-06-25 LAB
ABO GROUP (TYPE) IN BLOOD: NORMAL
ANTIBODY SCREEN: NORMAL
RH FACTOR (ANTIGEN D): NORMAL

## 2025-07-09 ENCOUNTER — APPOINTMENT (OUTPATIENT)
Dept: OBSTETRICS AND GYNECOLOGY | Facility: CLINIC | Age: 35
End: 2025-07-09
Payer: COMMERCIAL

## 2025-07-09 VITALS — DIASTOLIC BLOOD PRESSURE: 61 MMHG | SYSTOLIC BLOOD PRESSURE: 99 MMHG | BODY MASS INDEX: 27.03 KG/M2 | WEIGHT: 152.6 LBS

## 2025-07-09 DIAGNOSIS — Z71.85 IMMUNIZATION COUNSELING: ICD-10-CM

## 2025-07-09 DIAGNOSIS — Z3A.32 32 WEEKS GESTATION OF PREGNANCY (HHS-HCC): Primary | ICD-10-CM

## 2025-07-09 DIAGNOSIS — F41.9 ANXIETY: ICD-10-CM

## 2025-07-09 DIAGNOSIS — Z23 NEED FOR TDAP VACCINATION: ICD-10-CM

## 2025-07-09 PROCEDURE — 90715 TDAP VACCINE 7 YRS/> IM: CPT | Performed by: ADVANCED PRACTICE MIDWIFE

## 2025-07-09 PROCEDURE — 90471 IMMUNIZATION ADMIN: CPT | Performed by: ADVANCED PRACTICE MIDWIFE

## 2025-07-09 PROCEDURE — 0501F PRENATAL FLOW SHEET: CPT | Performed by: ADVANCED PRACTICE MIDWIFE

## 2025-07-09 NOTE — PROGRESS NOTES
Ob Visit  25     SUBJECTIVE    HPI: Charmayne Chiracu is a 34 y.o.  at 32w3d here for Return OB visit.  Charmayne reports sharp pain started around 3pm, took breathe away, lower abdomen, denies any constipation or diarrhea, stool was slightly more loose today.   Reviewed signs of PTL and verbalizes understanding to call CNM at main campus should she continue to be uncomfortable.   Charmayne was counseled on the recommendation for and benefits of TDaP vaccination during pregnancy.  We discussed the passive immunity that occurs after maternal vaccination during pregnancy, and the antibodies that cross the placenta to the fetus to protect baby in the first few months of life.  Babies do not receive their first vaccination for pertussis/whooping cough until 2 months of life, during which the baby is vulnerable to this bacterial infection.  Whooping cough can cause severe and even life-threatening infections, and maternal vaccination between 27 and 36 weeks of pregnancy is the best method to protect baby from Whooping cough until they are eligible for the vaccination. After discussion the patient accepted the vaccine.        OBJECTIVE  Visit Vitals  BP 99/61   Wt 69.2 kg (152 lb 9.6 oz)   LMP 2024 (Exact Date)   BMI 27.03 kg/m²   OB Status Pregnant   Smoking Status Never   BSA 1.75 m²        Cervical exam: 1/thick/-3    ASSESSMENT & PLAN    Charmayne Chiracu is a 34 y.o.  at 32w3d here for the following concerns we addressed today:    Problem List Items Addressed This Visit          Ob-Gyn Problems    32 weeks gestation of pregnancy ([facility]) - Primary    Overview   Desired provider in labor: [x] CNM  [] Physician   [] Either Acceptable  [x] Blood Products: [x] Yes, accepts [] No, needs counseling  [x] Initial BMI: 22.15   [x] Prenatal Labs: WNLs  [x] Cervical Cancer Screening up to date: 2024  NILM/HPV(-)   [x] Rh status: NEGATIVE  [x] Screen for IPV and Substance Use Risk:  [x] Genetic  Screening (cfDNA):  RR NIPTs  [x] First Trimester Anatomy Screen (11-13.6 wks): Declined  [x] Baby ASA (initiated): Yes  [x] Pregnancy dated by: LMP c/w 1st tri U/S with ЕЛЕНА     [x] Anatomy US: (19-20 wks): WNLs, placenta anterior & low lying  [x]  Repeat U/S at 30 weeks - placenta location --> RESOLVED  [] Federal Sterilization consent signed (if indicated):  [x] 1hr GCT at 24-28wks: 129  [x] Rhogam: given 6/24/25  [] Fetal Surveillance (if indicated):  [x] Tdap (27-32 wks, may be given up to 36 wks if initial window missed): 7/9/25  [x] Flu vaccine: 1/2025, plans covid booster       [x] Feeding Intentions: breast  [] Postpartum Birth control method:   [] GBS at 36 - 37 wks:  [] 39 weeks discussion of IOL vs. Expectant management:  [x] Mode of delivery ( anticipated ): vaginal              Other    Anxiety    Overview   60 mg Prozac daily  Therapist monthly              Return to care in 2 weeks or sooner as needed      Padmini Frias, SANJANA-LENIM

## 2025-07-23 ENCOUNTER — APPOINTMENT (OUTPATIENT)
Dept: OBSTETRICS AND GYNECOLOGY | Facility: CLINIC | Age: 35
End: 2025-07-23
Payer: COMMERCIAL

## 2025-07-23 VITALS — DIASTOLIC BLOOD PRESSURE: 70 MMHG | WEIGHT: 157 LBS | SYSTOLIC BLOOD PRESSURE: 108 MMHG | BODY MASS INDEX: 27.81 KG/M2

## 2025-07-23 DIAGNOSIS — Z3A.34 34 WEEKS GESTATION OF PREGNANCY (HHS-HCC): Primary | ICD-10-CM

## 2025-07-23 PROCEDURE — 0501F PRENATAL FLOW SHEET: CPT | Performed by: ADVANCED PRACTICE MIDWIFE

## 2025-07-23 NOTE — PROGRESS NOTES
Ob Visit  25     SUBJECTIVE    HPI: Charmayne Chiracu is a 34 y.o.  at 34w3d here for Return OB visit.  Charmayne reports good fetal movement, no OB concerns other than some back discomfort.   Advised of GBS culture next visit.        OBJECTIVE  Visit Vitals  /70   Wt 71.2 kg (157 lb)   LMP 2024 (Exact Date)   BMI 27.81 kg/m²   OB Status Pregnant   Smoking Status Never   BSA 1.78 m²            ASSESSMENT & PLAN    Charmayne Chiracu is a 34 y.o.  at 34w3d here for the following concerns we addressed today:    Problem List Items Addressed This Visit          Ob-Gyn Problems    34 weeks gestation of pregnancy ([facility]) - Primary    Overview   Desired provider in labor: [x] CNM  [] Physician   [] Either Acceptable  [x] Blood Products: [x] Yes, accepts [] No, needs counseling  [x] Initial BMI: 22.15   [x] Prenatal Labs: WNLs  [x] Cervical Cancer Screening up to date: 2024  NILM/HPV(-)   [x] Rh status: NEGATIVE  [x] Screen for IPV and Substance Use Risk:  [x] Genetic Screening (cfDNA):  RR NIPTs  [x] First Trimester Anatomy Screen (11-13.6 wks): Declined  [x] Baby ASA (initiated): Yes  [x] Pregnancy dated by: LMP c/w 1st tri U/S with ЕЛЕНА     [x] Anatomy US: (19-20 wks): WNLs, placenta anterior & low lying  [x]  Repeat U/S at 30 weeks - placenta location --> RESOLVED  [] Federal Sterilization consent signed (if indicated):  [x] 1hr GCT at 24-28wks: 129  [x] Rhogam: given 25  [] Fetal Surveillance (if indicated):  [x] Tdap (27-32 wks, may be given up to 36 wks if initial window missed): 25  [x] Flu vaccine: 2025, plans covid booster       [x] Feeding Intentions: breast  [] Postpartum Birth control method:   [] GBS at 36 - 37 wks:  [] 39 weeks discussion of IOL vs. Expectant management:  [x] Mode of delivery ( anticipated ): vaginal                Return to care in 2 weeks or sooner as needed      Padmini Frias, APRN-CNM

## 2025-08-04 ENCOUNTER — TELEPHONE (OUTPATIENT)
Dept: OBSTETRICS AND GYNECOLOGY | Facility: CLINIC | Age: 35
End: 2025-08-04
Payer: COMMERCIAL

## 2025-08-07 ENCOUNTER — APPOINTMENT (OUTPATIENT)
Facility: CLINIC | Age: 35
End: 2025-08-07
Payer: COMMERCIAL

## 2025-08-07 VITALS — SYSTOLIC BLOOD PRESSURE: 120 MMHG | DIASTOLIC BLOOD PRESSURE: 65 MMHG | BODY MASS INDEX: 27.92 KG/M2 | WEIGHT: 157.6 LBS

## 2025-08-07 DIAGNOSIS — Z3A.36 36 WEEKS GESTATION OF PREGNANCY (HHS-HCC): ICD-10-CM

## 2025-08-07 DIAGNOSIS — Z14.1 CYSTIC FIBROSIS GENE CARRIER: ICD-10-CM

## 2025-08-07 DIAGNOSIS — Z34.83 PRENATAL CARE, SUBSEQUENT PREGNANCY IN THIRD TRIMESTER: Primary | ICD-10-CM

## 2025-08-07 DIAGNOSIS — O09.523 MULTIGRAVIDA OF ADVANCED MATERNAL AGE IN THIRD TRIMESTER (HHS-HCC): ICD-10-CM

## 2025-08-07 PROCEDURE — 0501F PRENATAL FLOW SHEET: CPT

## 2025-08-07 NOTE — PROGRESS NOTES
Assessment/Plan   34 y.o.  at 36w4d  - Reviewed signs and symptoms of labor including regular, painful contractions every 5 minutes or less for 2 hours, gush of fluid, or leaking of fluid. Reviewed warning signs including decreased fetal movement or vaginal bleeding. Pt has emergency answering service number and aware of when to call. Reviewed with pt that when presenting for labor, she will enter through the emergency department before proceeding to 2nd floor labor and delivery.   - GBS collected today  - Labor consent signed  - Pt expresses interest in 39 week elective IOL. Risks, benefits and expectations for all possible induction modalities including CRB, misoprostol, Pitocin, and AROM were reviewed. Pt aware that induction may take up to or beyond 24 hours until infant is born, especially if cervical ripening is required. Also reminded patient that most labors will start organically by 41 weeks if not induced. Pt verbalizes understanding, will continue to think about her options, but now leaning towards expectant management.   - Routine prenatal care    Follow up in 1 week for next prenatal visit    ELEUTERIO Brown    Subjective Charmayne Chiracu is a 34 y.o.  at 36w4d with a working estimated date of delivery of 2025, by Last Menstrual Period presenting for a routine prenatal visit. She is doing well, no concerns. She denies vaginal bleeding, leakage of fluid, or regular contractions. She endorses good FM.    Symphysis pubic pain, some [place]. Would like a cervical exam.     Her pregnancy is complicated by:  Pregnancy Problems (from 25 to present)       Problem Noted Diagnosed Resolved    Multigravida of advanced maternal age in third trimester ([facility]) 2025 by ELEUTERIO Ford  No    Priority:  Medium       Overview Addendum 2025  4:28 PM by ELEUTERIO Ford   Growth at 30 & 36 wks:  30 weeks: Appropriate fetal growth: 1642 g at the  61%          36 weeks gestation of pregnancy ([facility]) 3/27/2025 by ELEUTERIO Ford  No    Priority:  Medium       Overview Addendum 7/9/2025  5:00 PM by ELEUTERIO Ford   Desired provider in labor: [x] CNM  [] Physician   [] Either Acceptable  [x] Blood Products: [x] Yes, accepts [] No, needs counseling  [x] Initial BMI: 22.15   [x] Prenatal Labs: WNLs  [x] Cervical Cancer Screening up to date: 1/2024  NILM/HPV(-)   [x] Rh status: NEGATIVE  [x] Screen for IPV and Substance Use Risk:  [x] Genetic Screening (cfDNA):  RR NIPTs  [x] First Trimester Anatomy Screen (11-13.6 wks): Declined  [x] Baby ASA (initiated): Yes  [x] Pregnancy dated by: LMP c/w 1st tri U/S with ЕЛЕНА     [x] Anatomy US: (19-20 wks): WNLs, placenta anterior & low lying  [x]  Repeat U/S at 30 weeks - placenta location --> RESOLVED  [] Federal Sterilization consent signed (if indicated):  [x] 1hr GCT at 24-28wks: 129  [x] Rhogam: given 6/24/25  [] Fetal Surveillance (if indicated):  [x] Tdap (27-32 wks, may be given up to 36 wks if initial window missed): 7/9/25  [x] Flu vaccine: 1/2025, plans covid booster       [x] Feeding Intentions: breast  [] Postpartum Birth control method:   [] GBS at 36 - 37 wks:  [] 39 weeks discussion of IOL vs. 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          Low lying placenta, antepartum ([facility]) 4/11/2025 by ELEUTERIO Ford  6/23/2025 by ELEUTERIO Ford    Priority:  Medium       Overview Addendum 6/23/2025  4:29 PM by ELEUTERIO Ford   Placenta is anterior and low-lying, measuring 5 mm away from  the internal cervical os   Repeat scan at 30 weeks  Transvaginal evaluation in addition to transabdominal scanning was indicated and performed due to placental concern. The placental edge measures greater than 5.0 cm from the internal cervical os (normal).                   Objective   Weight: 71.5 kg (157 lb 9.6 oz)  TW.8 kg (32 lb 9.6 oz)   Expected Total Weight Gain: 11.5 kg (25 lb)-16 kg (35 lb)   Pregravid BMI: 22.15  Pregravid Weight: 56.7 kg (125 lb)   BP: 120/65  Fetal Heart Rate: 131 Fundal Height (cm): 36 cm Presentation: Vertex  Dilation: 1 Effacement (%): 20 Fetal Station: Ballotable

## 2025-08-10 LAB — GP B STREP SPEC QL CULT: NORMAL

## 2025-08-12 ENCOUNTER — APPOINTMENT (OUTPATIENT)
Dept: DERMATOLOGY | Facility: CLINIC | Age: 35
End: 2025-08-12
Payer: COMMERCIAL

## 2025-08-14 ENCOUNTER — APPOINTMENT (OUTPATIENT)
Facility: CLINIC | Age: 35
End: 2025-08-14
Payer: COMMERCIAL

## 2025-08-14 VITALS — SYSTOLIC BLOOD PRESSURE: 110 MMHG | WEIGHT: 159.6 LBS | DIASTOLIC BLOOD PRESSURE: 68 MMHG | BODY MASS INDEX: 28.27 KG/M2

## 2025-08-14 DIAGNOSIS — O09.523 MULTIGRAVIDA OF ADVANCED MATERNAL AGE IN THIRD TRIMESTER (HHS-HCC): ICD-10-CM

## 2025-08-14 DIAGNOSIS — Z3A.37 37 WEEKS GESTATION OF PREGNANCY (HHS-HCC): Primary | ICD-10-CM

## 2025-08-14 DIAGNOSIS — Z34.83 PRENATAL CARE, SUBSEQUENT PREGNANCY IN THIRD TRIMESTER: ICD-10-CM

## 2025-08-14 DIAGNOSIS — Z14.1 CYSTIC FIBROSIS GENE CARRIER: ICD-10-CM

## 2025-08-14 PROCEDURE — 0501F PRENATAL FLOW SHEET: CPT

## 2025-08-21 ENCOUNTER — APPOINTMENT (OUTPATIENT)
Facility: CLINIC | Age: 35
End: 2025-08-21
Payer: COMMERCIAL

## 2025-08-21 VITALS — WEIGHT: 158.8 LBS | BODY MASS INDEX: 28.13 KG/M2 | SYSTOLIC BLOOD PRESSURE: 115 MMHG | DIASTOLIC BLOOD PRESSURE: 60 MMHG

## 2025-08-21 DIAGNOSIS — O09.523 MULTIGRAVIDA OF ADVANCED MATERNAL AGE IN THIRD TRIMESTER (HHS-HCC): ICD-10-CM

## 2025-08-21 DIAGNOSIS — Z34.83 PRENATAL CARE, SUBSEQUENT PREGNANCY IN THIRD TRIMESTER: ICD-10-CM

## 2025-08-21 DIAGNOSIS — Z3A.38 38 WEEKS GESTATION OF PREGNANCY (HHS-HCC): Primary | ICD-10-CM

## 2025-08-21 DIAGNOSIS — Z14.1 CYSTIC FIBROSIS GENE CARRIER: ICD-10-CM

## 2025-08-21 PROCEDURE — 0501F PRENATAL FLOW SHEET: CPT

## 2025-08-24 ENCOUNTER — ANESTHESIA EVENT (OUTPATIENT)
Dept: OBSTETRICS AND GYNECOLOGY | Facility: HOSPITAL | Age: 35
End: 2025-08-24
Payer: COMMERCIAL

## 2025-08-24 ENCOUNTER — ANESTHESIA (OUTPATIENT)
Dept: OBSTETRICS AND GYNECOLOGY | Facility: HOSPITAL | Age: 35
End: 2025-08-24
Payer: COMMERCIAL

## 2025-08-24 ENCOUNTER — TELEPHONE (OUTPATIENT)
Dept: OBSTETRICS AND GYNECOLOGY | Facility: HOSPITAL | Age: 35
End: 2025-08-24
Payer: COMMERCIAL

## 2025-08-24 ENCOUNTER — HOSPITAL ENCOUNTER (INPATIENT)
Facility: HOSPITAL | Age: 35
LOS: 3 days | Discharge: HOME | End: 2025-08-27
Attending: OBSTETRICS & GYNECOLOGY | Admitting: MIDWIFE
Payer: COMMERCIAL

## 2025-08-24 DIAGNOSIS — D64.9 POSTOPERATIVE ANEMIA: ICD-10-CM

## 2025-08-24 DIAGNOSIS — G89.18 POSTOPERATIVE PAIN: ICD-10-CM

## 2025-08-24 DIAGNOSIS — R52 POSTPARTUM PAIN (HHS-HCC): Primary | ICD-10-CM

## 2025-08-24 LAB
ABO GROUP (TYPE) IN BLOOD: NORMAL
ANTIBODY SCREEN: NORMAL
BB ANTIBODY IDENTIFICATION: NORMAL
CASE #: NORMAL
ERYTHROCYTE [DISTWIDTH] IN BLOOD BY AUTOMATED COUNT: 12.6 % (ref 11.5–14.5)
HCT VFR BLD AUTO: 34.8 % (ref 36–46)
HGB BLD-MCNC: 12.2 G/DL (ref 12–16)
MCH RBC QN AUTO: 33.2 PG (ref 26–34)
MCHC RBC AUTO-ENTMCNC: 35.1 G/DL (ref 32–36)
MCV RBC AUTO: 95 FL (ref 80–100)
NRBC BLD-RTO: 0 /100 WBCS (ref 0–0)
PLATELET # BLD AUTO: 203 X10*3/UL (ref 150–450)
RBC # BLD AUTO: 3.67 X10*6/UL (ref 4–5.2)
RH FACTOR (ANTIGEN D): NORMAL
WBC # BLD AUTO: 10.9 X10*3/UL (ref 4.4–11.3)

## 2025-08-24 PROCEDURE — 2500000004 HC RX 250 GENERAL PHARMACY W/ HCPCS (ALT 636 FOR OP/ED): Performed by: NURSE ANESTHETIST, CERTIFIED REGISTERED

## 2025-08-24 PROCEDURE — 59050 FETAL MONITOR W/REPORT: CPT

## 2025-08-24 PROCEDURE — 01967 NEURAXL LBR ANES VAG DLVR: CPT | Performed by: NURSE ANESTHETIST, CERTIFIED REGISTERED

## 2025-08-24 PROCEDURE — 86780 TREPONEMA PALLIDUM: CPT | Mod: STJLAB | Performed by: MIDWIFE

## 2025-08-24 PROCEDURE — 3700000014 EPIDURAL BLOCK: Performed by: NURSE ANESTHETIST, CERTIFIED REGISTERED

## 2025-08-24 PROCEDURE — 2500000004 HC RX 250 GENERAL PHARMACY W/ HCPCS (ALT 636 FOR OP/ED): Performed by: MIDWIFE

## 2025-08-24 PROCEDURE — 86870 RBC ANTIBODY IDENTIFICATION: CPT

## 2025-08-24 PROCEDURE — 36415 COLL VENOUS BLD VENIPUNCTURE: CPT | Performed by: MIDWIFE

## 2025-08-24 PROCEDURE — 85027 COMPLETE CBC AUTOMATED: CPT | Performed by: MIDWIFE

## 2025-08-24 PROCEDURE — 86901 BLOOD TYPING SEROLOGIC RH(D): CPT | Performed by: MIDWIFE

## 2025-08-24 PROCEDURE — 51702 INSERT TEMP BLADDER CATH: CPT

## 2025-08-24 PROCEDURE — 86922 COMPATIBILITY TEST ANTIGLOB: CPT

## 2025-08-24 PROCEDURE — 7210000002 HC LABOR PER HOUR

## 2025-08-24 PROCEDURE — 1120000001 HC OB PRIVATE ROOM DAILY

## 2025-08-24 RX ORDER — TERBUTALINE SULFATE 1 MG/ML
0.25 INJECTION SUBCUTANEOUS ONCE AS NEEDED
Status: COMPLETED | OUTPATIENT
Start: 2025-08-24 | End: 2025-08-25

## 2025-08-24 RX ORDER — OXYTOCIN/0.9 % SODIUM CHLORIDE 30/500 ML
60 PLASTIC BAG, INJECTION (ML) INTRAVENOUS
Status: DISCONTINUED | OUTPATIENT
Start: 2025-08-24 | End: 2025-08-25

## 2025-08-24 RX ORDER — MISOPROSTOL 200 UG/1
800 TABLET ORAL ONCE AS NEEDED
Status: DISCONTINUED | OUTPATIENT
Start: 2025-08-24 | End: 2025-08-25

## 2025-08-24 RX ORDER — HYDRALAZINE HYDROCHLORIDE 20 MG/ML
5 INJECTION INTRAMUSCULAR; INTRAVENOUS ONCE AS NEEDED
Status: DISCONTINUED | OUTPATIENT
Start: 2025-08-24 | End: 2025-08-25

## 2025-08-24 RX ORDER — TRANEXAMIC ACID 1 G/10ML
1000 INJECTION, SOLUTION INTRAVENOUS ONCE AS NEEDED
Status: DISCONTINUED | OUTPATIENT
Start: 2025-08-24 | End: 2025-08-25

## 2025-08-24 RX ORDER — DEXTROAMPHETAMINE SACCHARATE, AMPHETAMINE ASPARTATE, DEXTROAMPHETAMINE SULFATE AND AMPHETAMINE SULFATE 2.5; 2.5; 2.5; 2.5 MG/1; MG/1; MG/1; MG/1
5 TABLET ORAL 2 TIMES DAILY
Refills: 0 | Status: DISCONTINUED | OUTPATIENT
Start: 2025-08-24 | End: 2025-08-25

## 2025-08-24 RX ORDER — FLUTICASONE FUROATE AND VILANTEROL 200; 25 UG/1; UG/1
1 POWDER RESPIRATORY (INHALATION) DAILY
Status: DISCONTINUED | OUTPATIENT
Start: 2025-08-24 | End: 2025-08-25

## 2025-08-24 RX ORDER — ONDANSETRON 4 MG/1
4 TABLET, FILM COATED ORAL EVERY 6 HOURS PRN
Status: DISCONTINUED | OUTPATIENT
Start: 2025-08-24 | End: 2025-08-25

## 2025-08-24 RX ORDER — FENTANYL/ROPIVACAINE/NS/PF 2MCG/ML-.2
0-25 PLASTIC BAG, INJECTION (ML) INJECTION CONTINUOUS
Status: DISCONTINUED | OUTPATIENT
Start: 2025-08-24 | End: 2025-08-25

## 2025-08-24 RX ORDER — METHYLERGONOVINE MALEATE 0.2 MG/ML
0.2 INJECTION INTRAVENOUS ONCE AS NEEDED
Status: DISCONTINUED | OUTPATIENT
Start: 2025-08-24 | End: 2025-08-25

## 2025-08-24 RX ORDER — OXYTOCIN/0.9 % SODIUM CHLORIDE 30/500 ML
2-30 PLASTIC BAG, INJECTION (ML) INTRAVENOUS CONTINUOUS
Status: DISCONTINUED | OUTPATIENT
Start: 2025-08-24 | End: 2025-08-25

## 2025-08-24 RX ORDER — LABETALOL HYDROCHLORIDE 5 MG/ML
20 INJECTION, SOLUTION INTRAVENOUS ONCE AS NEEDED
Status: DISCONTINUED | OUTPATIENT
Start: 2025-08-24 | End: 2025-08-25

## 2025-08-24 RX ORDER — ONDANSETRON HYDROCHLORIDE 2 MG/ML
4 INJECTION, SOLUTION INTRAVENOUS EVERY 6 HOURS PRN
Status: DISCONTINUED | OUTPATIENT
Start: 2025-08-24 | End: 2025-08-25

## 2025-08-24 RX ORDER — OXYTOCIN 10 [USP'U]/ML
10 INJECTION, SOLUTION INTRAMUSCULAR; INTRAVENOUS ONCE AS NEEDED
Status: DISCONTINUED | OUTPATIENT
Start: 2025-08-24 | End: 2025-08-25

## 2025-08-24 RX ORDER — CARBOPROST TROMETHAMINE 250 UG/ML
250 INJECTION, SOLUTION INTRAMUSCULAR ONCE AS NEEDED
Status: DISCONTINUED | OUTPATIENT
Start: 2025-08-24 | End: 2025-08-25

## 2025-08-24 RX ORDER — SODIUM CHLORIDE, SODIUM LACTATE, POTASSIUM CHLORIDE, CALCIUM CHLORIDE 600; 310; 30; 20 MG/100ML; MG/100ML; MG/100ML; MG/100ML
75 INJECTION, SOLUTION INTRAVENOUS CONTINUOUS
Status: DISCONTINUED | OUTPATIENT
Start: 2025-08-24 | End: 2025-08-25

## 2025-08-24 RX ORDER — FLUTICASONE FUROATE AND VILANTEROL 200; 25 UG/1; UG/1
1 POWDER RESPIRATORY (INHALATION) DAILY
Status: DISCONTINUED | OUTPATIENT
Start: 2025-08-24 | End: 2025-08-24

## 2025-08-24 RX ORDER — FLUTICASONE PROPIONATE AND SALMETEROL 500; 50 UG/1; UG/1
1 POWDER RESPIRATORY (INHALATION)
Status: DISCONTINUED | OUTPATIENT
Start: 2025-08-24 | End: 2025-08-24

## 2025-08-24 RX ORDER — NIFEDIPINE 10 MG/1
10 CAPSULE ORAL ONCE AS NEEDED
Status: DISCONTINUED | OUTPATIENT
Start: 2025-08-24 | End: 2025-08-25

## 2025-08-24 RX ORDER — LIDOCAINE HYDROCHLORIDE 10 MG/ML
30 INJECTION, SOLUTION INFILTRATION; PERINEURAL ONCE AS NEEDED
Status: DISCONTINUED | OUTPATIENT
Start: 2025-08-24 | End: 2025-08-25

## 2025-08-24 RX ORDER — LOPERAMIDE HYDROCHLORIDE 2 MG/1
4 CAPSULE ORAL EVERY 2 HOUR PRN
Status: DISCONTINUED | OUTPATIENT
Start: 2025-08-24 | End: 2025-08-25

## 2025-08-24 RX ORDER — CALCIUM CARBONATE 200(500)MG
1 TABLET,CHEWABLE ORAL EVERY 6 HOURS PRN
Status: DISCONTINUED | OUTPATIENT
Start: 2025-08-24 | End: 2025-08-25

## 2025-08-24 RX ORDER — FLUOXETINE HYDROCHLORIDE 60 MG/1
60 TABLET, FILM COATED ORAL; ORAL DAILY
Status: DISCONTINUED | OUTPATIENT
Start: 2025-08-25 | End: 2025-08-25

## 2025-08-24 RX ORDER — ALBUTEROL SULFATE 0.83 MG/ML
2.5 SOLUTION RESPIRATORY (INHALATION) EVERY 4 HOURS PRN
Status: DISCONTINUED | OUTPATIENT
Start: 2025-08-24 | End: 2025-08-25

## 2025-08-24 RX ADMIN — Medication 2 MILLI-UNITS/MIN: at 18:46

## 2025-08-24 RX ADMIN — SODIUM CHLORIDE, SODIUM LACTATE, POTASSIUM CHLORIDE, AND CALCIUM CHLORIDE 75 ML/HR: .6; .31; .03; .02 INJECTION, SOLUTION INTRAVENOUS at 18:46

## 2025-08-24 RX ADMIN — Medication 7 ML/HR: at 22:53

## 2025-08-24 RX ADMIN — SODIUM CHLORIDE, SODIUM LACTATE, POTASSIUM CHLORIDE, AND CALCIUM CHLORIDE 75 ML/HR: .6; .31; .03; .02 INJECTION, SOLUTION INTRAVENOUS at 22:59

## 2025-08-24 RX ADMIN — SODIUM CHLORIDE, SODIUM LACTATE, POTASSIUM CHLORIDE, AND CALCIUM CHLORIDE 500 ML: .6; .31; .03; .02 INJECTION, SOLUTION INTRAVENOUS at 22:00

## 2025-08-24 SDOH — HEALTH STABILITY: MENTAL HEALTH: NON-SPECIFIC ACTIVE SUICIDAL THOUGHTS (PAST 1 MONTH): NO

## 2025-08-24 SDOH — HEALTH STABILITY: MENTAL HEALTH: SUICIDAL BEHAVIOR (LIFETIME): NO

## 2025-08-24 SDOH — ECONOMIC STABILITY: FOOD INSECURITY: WITHIN THE PAST 12 MONTHS, THE FOOD YOU BOUGHT JUST DIDN'T LAST AND YOU DIDN'T HAVE MONEY TO GET MORE.: PATIENT DECLINED

## 2025-08-24 SDOH — HEALTH STABILITY: MENTAL HEALTH: WERE YOU ABLE TO COMPLETE ALL THE BEHAVIORAL HEALTH SCREENINGS?: YES

## 2025-08-24 SDOH — SOCIAL STABILITY: SOCIAL INSECURITY: HAS ANYONE EVER THREATENED TO HURT YOUR FAMILY OR YOUR PETS?: NO

## 2025-08-24 SDOH — SOCIAL STABILITY: SOCIAL INSECURITY: WITHIN THE LAST YEAR, HAVE YOU BEEN HUMILIATED OR EMOTIONALLY ABUSED IN OTHER WAYS BY YOUR PARTNER OR EX-PARTNER?: NO

## 2025-08-24 SDOH — ECONOMIC STABILITY: HOUSING INSECURITY: DO YOU FEEL UNSAFE GOING BACK TO THE PLACE WHERE YOU ARE LIVING?: NO

## 2025-08-24 SDOH — SOCIAL STABILITY: SOCIAL INSECURITY
WITHIN THE LAST YEAR, HAVE YOU BEEN RAPED OR FORCED TO HAVE ANY KIND OF SEXUAL ACTIVITY BY YOUR PARTNER OR EX-PARTNER?: NO

## 2025-08-24 SDOH — HEALTH STABILITY: MENTAL HEALTH: WISH TO BE DEAD (PAST 1 MONTH): NO

## 2025-08-24 SDOH — SOCIAL STABILITY: SOCIAL INSECURITY: ARE YOU OR HAVE YOU BEEN THREATENED OR ABUSED PHYSICALLY, EMOTIONALLY, OR SEXUALLY BY ANYONE?: NO

## 2025-08-24 SDOH — ECONOMIC STABILITY: FOOD INSECURITY
WITHIN THE PAST 12 MONTHS, YOU WORRIED THAT YOUR FOOD WOULD RUN OUT BEFORE YOU GOT THE MONEY TO BUY MORE.: PATIENT DECLINED

## 2025-08-24 SDOH — HEALTH STABILITY: MENTAL HEALTH: CURRENT SMOKER: 0

## 2025-08-24 SDOH — SOCIAL STABILITY: SOCIAL INSECURITY: DOES ANYONE TRY TO KEEP YOU FROM HAVING/CONTACTING OTHER FRIENDS OR DOING THINGS OUTSIDE YOUR HOME?: NO

## 2025-08-24 SDOH — SOCIAL STABILITY: SOCIAL INSECURITY: PHYSICAL ABUSE: DENIES

## 2025-08-24 SDOH — SOCIAL STABILITY: SOCIAL INSECURITY: WITHIN THE LAST YEAR, HAVE YOU BEEN AFRAID OF YOUR PARTNER OR EX-PARTNER?: NO

## 2025-08-24 SDOH — SOCIAL STABILITY: SOCIAL INSECURITY
WITHIN THE LAST YEAR, HAVE YOU BEEN KICKED, HIT, SLAPPED, OR OTHERWISE PHYSICALLY HURT BY YOUR PARTNER OR EX-PARTNER?: NO

## 2025-08-24 SDOH — SOCIAL STABILITY: SOCIAL INSECURITY: ABUSE SCREEN: ADULT

## 2025-08-24 SDOH — SOCIAL STABILITY: SOCIAL INSECURITY: VERBAL ABUSE: DENIES

## 2025-08-24 SDOH — SOCIAL STABILITY: SOCIAL INSECURITY: DO YOU FEEL ANYONE HAS EXPLOITED OR TAKEN ADVANTAGE OF YOU FINANCIALLY OR OF YOUR PERSONAL PROPERTY?: NO

## 2025-08-24 SDOH — SOCIAL STABILITY: SOCIAL INSECURITY: ARE THERE ANY APPARENT SIGNS OF INJURIES/BEHAVIORS THAT COULD BE RELATED TO ABUSE/NEGLECT?: NO

## 2025-08-24 SDOH — SOCIAL STABILITY: SOCIAL INSECURITY: HAVE YOU HAD THOUGHTS OF HARMING ANYONE ELSE?: NO

## 2025-08-24 ASSESSMENT — PAIN SCALES - GENERAL
PAINLEVEL_OUTOF10: 0 - NO PAIN
PAINLEVEL_OUTOF10: 8
PAINLEVEL_OUTOF10: 5 - MODERATE PAIN
PAINLEVEL_OUTOF10: 4
PAINLEVEL_OUTOF10: 8
PAINLEVEL_OUTOF10: 5 - MODERATE PAIN
PAINLEVEL_OUTOF10: 5 - MODERATE PAIN
PAINLEVEL_OUTOF10: 8
PAINLEVEL_OUTOF10: 5 - MODERATE PAIN
PAINLEVEL_OUTOF10: 3
PAINLEVEL_OUTOF10: 5 - MODERATE PAIN
PAINLEVEL_OUTOF10: 7

## 2025-08-24 ASSESSMENT — PATIENT HEALTH QUESTIONNAIRE - PHQ9
1. LITTLE INTEREST OR PLEASURE IN DOING THINGS: NOT AT ALL
2. FEELING DOWN, DEPRESSED OR HOPELESS: NOT AT ALL
SUM OF ALL RESPONSES TO PHQ9 QUESTIONS 1 & 2: 0

## 2025-08-24 ASSESSMENT — LIFESTYLE VARIABLES
AUDIT-C TOTAL SCORE: 0
HOW OFTEN DO YOU HAVE 6 OR MORE DRINKS ON ONE OCCASION: NEVER
HOW OFTEN DO YOU HAVE A DRINK CONTAINING ALCOHOL: NEVER
HOW MANY STANDARD DRINKS CONTAINING ALCOHOL DO YOU HAVE ON A TYPICAL DAY: PATIENT DOES NOT DRINK
SKIP TO QUESTIONS 9-10: 1
AUDIT-C TOTAL SCORE: 0

## 2025-08-24 ASSESSMENT — ACTIVITIES OF DAILY LIVING (ADL): LACK_OF_TRANSPORTATION: PATIENT DECLINED

## 2025-08-25 ENCOUNTER — APPOINTMENT (OUTPATIENT)
Facility: CLINIC | Age: 35
End: 2025-08-25
Payer: COMMERCIAL

## 2025-08-25 PROBLEM — Z3A.39 39 WEEKS GESTATION OF PREGNANCY (HHS-HCC): Status: ACTIVE | Noted: 2025-03-27

## 2025-08-25 LAB
BASE EXCESS BLDCOA CALC-SCNC: -2.2 MMOL/L (ref -10.8–-0.5)
BASE EXCESS BLDCOV CALC-SCNC: 0 MMOL/L (ref -8.1–-0.5)
BODY TEMPERATURE: 37 DEGREES CELSIUS
BODY TEMPERATURE: 37 DEGREES CELSIUS
ERYTHROCYTE [DISTWIDTH] IN BLOOD BY AUTOMATED COUNT: 12.6 % (ref 11.5–14.5)
HCO3 BLDCOA-SCNC: 25.4 MMOL/L (ref 15–29)
HCO3 BLDCOV-SCNC: 25.4 MMOL/L (ref 16–26)
HCT VFR BLD AUTO: 26.7 % (ref 36–46)
HGB BLD-MCNC: 9.2 G/DL (ref 12–16)
HOLD SPECIMEN: NORMAL
INHALED O2 CONCENTRATION: 21 %
INHALED O2 CONCENTRATION: 21 %
MCH RBC QN AUTO: 33.3 PG (ref 26–34)
MCHC RBC AUTO-ENTMCNC: 34.5 G/DL (ref 32–36)
MCV RBC AUTO: 97 FL (ref 80–100)
NRBC BLD-RTO: 0 /100 WBCS (ref 0–0)
OXYHGB MFR BLDCOA: 26.5 % (ref 94–98)
OXYHGB MFR BLDCOV: 61 % (ref 94–98)
PCO2 BLDCOA: 54 MM HG (ref 31–75)
PCO2 BLDCOV: 43 MM HG (ref 22–53)
PH BLDCOA: 7.28 PH (ref 7.08–7.39)
PH BLDCOV: 7.38 PH (ref 7.19–7.47)
PLATELET # BLD AUTO: 156 X10*3/UL (ref 150–450)
PO2 BLDCOA: 15 MM HG (ref 5–31)
PO2 BLDCOV: 27 MM HG (ref 13–37)
RBC # BLD AUTO: 2.76 X10*6/UL (ref 4–5.2)
SAO2 % BLDCOA: 27 % (ref 3–69)
SAO2 % BLDCOV: 62 % (ref 16–84)
TREPONEMA PALLIDUM IGG+IGM AB [PRESENCE] IN SERUM OR PLASMA BY IMMUNOASSAY: NONREACTIVE
WBC # BLD AUTO: 23 X10*3/UL (ref 4.4–11.3)

## 2025-08-25 PROCEDURE — 3700000014 HC AN EPIDURAL BLOCK CHARGE: Performed by: OBSTETRICS & GYNECOLOGY

## 2025-08-25 PROCEDURE — 85027 COMPLETE CBC AUTOMATED: CPT | Performed by: OBSTETRICS & GYNECOLOGY

## 2025-08-25 PROCEDURE — 7100000016 HC LABOR RECOVERY PER HOUR: Performed by: OBSTETRICS & GYNECOLOGY

## 2025-08-25 PROCEDURE — 2500000004 HC RX 250 GENERAL PHARMACY W/ HCPCS (ALT 636 FOR OP/ED): Performed by: OBSTETRICS & GYNECOLOGY

## 2025-08-25 PROCEDURE — 82805 BLOOD GASES W/O2 SATURATION: CPT | Performed by: MIDWIFE

## 2025-08-25 PROCEDURE — 2500000004 HC RX 250 GENERAL PHARMACY W/ HCPCS (ALT 636 FOR OP/ED): Performed by: NURSE ANESTHETIST, CERTIFIED REGISTERED

## 2025-08-25 PROCEDURE — 2500000001 HC RX 250 WO HCPCS SELF ADMINISTERED DRUGS (ALT 637 FOR MEDICARE OP): Performed by: NURSE ANESTHETIST, CERTIFIED REGISTERED

## 2025-08-25 PROCEDURE — 36415 COLL VENOUS BLD VENIPUNCTURE: CPT | Performed by: OBSTETRICS & GYNECOLOGY

## 2025-08-25 PROCEDURE — 3E033VJ INTRODUCTION OF OTHER HORMONE INTO PERIPHERAL VEIN, PERCUTANEOUS APPROACH: ICD-10-PCS | Performed by: OBSTETRICS & GYNECOLOGY

## 2025-08-25 PROCEDURE — 51702 INSERT TEMP BLADDER CATH: CPT

## 2025-08-25 PROCEDURE — 2500000004 HC RX 250 GENERAL PHARMACY W/ HCPCS (ALT 636 FOR OP/ED): Performed by: MIDWIFE

## 2025-08-25 PROCEDURE — 10907ZC DRAINAGE OF AMNIOTIC FLUID, THERAPEUTIC FROM PRODUCTS OF CONCEPTION, VIA NATURAL OR ARTIFICIAL OPENING: ICD-10-PCS | Performed by: OBSTETRICS & GYNECOLOGY

## 2025-08-25 PROCEDURE — 1220000001 HC OB SEMI-PRIVATE ROOM DAILY

## 2025-08-25 PROCEDURE — 7210000002 HC LABOR PER HOUR

## 2025-08-25 PROCEDURE — 36430 TRANSFUSION BLD/BLD COMPNT: CPT

## 2025-08-25 PROCEDURE — 01968 ANES/ANALG CS DLVR NEURAXIAL: CPT | Performed by: NURSE ANESTHETIST, CERTIFIED REGISTERED

## 2025-08-25 PROCEDURE — 0UC97ZZ EXTIRPATION OF MATTER FROM UTERUS, VIA NATURAL OR ARTIFICIAL OPENING: ICD-10-PCS | Performed by: OBSTETRICS & GYNECOLOGY

## 2025-08-25 PROCEDURE — 59510 CESAREAN DELIVERY: CPT | Performed by: OBSTETRICS & GYNECOLOGY

## 2025-08-25 PROCEDURE — P9045 ALBUMIN (HUMAN), 5%, 250 ML: HCPCS | Mod: JZ | Performed by: NURSE ANESTHETIST, CERTIFIED REGISTERED

## 2025-08-25 PROCEDURE — 2500000001 HC RX 250 WO HCPCS SELF ADMINISTERED DRUGS (ALT 637 FOR MEDICARE OP): Performed by: OBSTETRICS & GYNECOLOGY

## 2025-08-25 PROCEDURE — 59514 CESAREAN DELIVERY ONLY: CPT | Performed by: OBSTETRICS & GYNECOLOGY

## 2025-08-25 PROCEDURE — 88307 TISSUE EXAM BY PATHOLOGIST: CPT | Mod: TC,STJLAB | Performed by: OBSTETRICS & GYNECOLOGY

## 2025-08-25 PROCEDURE — 2720000007 HC OR 272 NO HCPCS: Performed by: OBSTETRICS & GYNECOLOGY

## 2025-08-25 PROCEDURE — P9016 RBC LEUKOCYTES REDUCED: HCPCS

## 2025-08-25 RX ORDER — FENTANYL CITRATE 50 UG/ML
INJECTION, SOLUTION INTRAMUSCULAR; INTRAVENOUS AS NEEDED
Status: DISCONTINUED | OUTPATIENT
Start: 2025-08-25 | End: 2025-08-25

## 2025-08-25 RX ORDER — OXYCODONE HYDROCHLORIDE 10 MG/1
10 TABLET ORAL EVERY 4 HOURS PRN
Status: DISCONTINUED | OUTPATIENT
Start: 2025-08-26 | End: 2025-08-27 | Stop reason: HOSPADM

## 2025-08-25 RX ORDER — IBUPROFEN 600 MG/1
600 TABLET, FILM COATED ORAL EVERY 6 HOURS
Status: DISCONTINUED | OUTPATIENT
Start: 2025-08-26 | End: 2025-08-27 | Stop reason: HOSPADM

## 2025-08-25 RX ORDER — FAMOTIDINE 10 MG/ML
INJECTION, SOLUTION INTRAVENOUS AS NEEDED
Status: DISCONTINUED | OUTPATIENT
Start: 2025-08-25 | End: 2025-08-25

## 2025-08-25 RX ORDER — DEXTROAMPHETAMINE SACCHARATE, AMPHETAMINE ASPARTATE, DEXTROAMPHETAMINE SULFATE AND AMPHETAMINE SULFATE 2.5; 2.5; 2.5; 2.5 MG/1; MG/1; MG/1; MG/1
5 TABLET ORAL
Refills: 0 | Status: DISCONTINUED | OUTPATIENT
Start: 2025-08-26 | End: 2025-08-27 | Stop reason: HOSPADM

## 2025-08-25 RX ORDER — LIDOCAINE 560 MG/1
1 PATCH PERCUTANEOUS; TOPICAL; TRANSDERMAL
Status: DISCONTINUED | OUTPATIENT
Start: 2025-08-25 | End: 2025-08-27 | Stop reason: HOSPADM

## 2025-08-25 RX ORDER — KETOROLAC TROMETHAMINE 30 MG/ML
INJECTION, SOLUTION INTRAMUSCULAR; INTRAVENOUS AS NEEDED
Status: DISCONTINUED | OUTPATIENT
Start: 2025-08-25 | End: 2025-08-25

## 2025-08-25 RX ORDER — OXYTOCIN 10 [USP'U]/ML
10 INJECTION, SOLUTION INTRAMUSCULAR; INTRAVENOUS ONCE AS NEEDED
Status: DISCONTINUED | OUTPATIENT
Start: 2025-08-25 | End: 2025-08-27 | Stop reason: HOSPADM

## 2025-08-25 RX ORDER — LIDOCAINE HCL/EPINEPHRINE/PF 2%-1:200K
VIAL (ML) INJECTION AS NEEDED
Status: DISCONTINUED | OUTPATIENT
Start: 2025-08-25 | End: 2025-08-25

## 2025-08-25 RX ORDER — CLINDAMYCIN PHOSPHATE 900 MG/50ML
900 INJECTION, SOLUTION INTRAVENOUS ONCE
Status: COMPLETED | OUTPATIENT
Start: 2025-08-25 | End: 2025-08-25

## 2025-08-25 RX ORDER — HYDROMORPHONE HYDROCHLORIDE 1 MG/ML
0.2 INJECTION, SOLUTION INTRAMUSCULAR; INTRAVENOUS; SUBCUTANEOUS EVERY 5 MIN PRN
Status: DISCONTINUED | OUTPATIENT
Start: 2025-08-25 | End: 2025-08-27 | Stop reason: HOSPADM

## 2025-08-25 RX ORDER — ADHESIVE BANDAGE
10 BANDAGE TOPICAL
Status: DISCONTINUED | OUTPATIENT
Start: 2025-08-25 | End: 2025-08-27 | Stop reason: HOSPADM

## 2025-08-25 RX ORDER — FLUOXETINE 20 MG/1
60 CAPSULE ORAL DAILY
Status: DISCONTINUED | OUTPATIENT
Start: 2025-08-25 | End: 2025-08-27 | Stop reason: HOSPADM

## 2025-08-25 RX ORDER — ACETAMINOPHEN 650 MG/1
SUPPOSITORY RECTAL AS NEEDED
Status: DISCONTINUED | OUTPATIENT
Start: 2025-08-25 | End: 2025-08-25

## 2025-08-25 RX ORDER — FLUOXETINE 20 MG/1
60 CAPSULE ORAL DAILY
Status: DISCONTINUED | OUTPATIENT
Start: 2025-08-25 | End: 2025-08-25

## 2025-08-25 RX ORDER — OXYCODONE HYDROCHLORIDE 5 MG/1
5 TABLET ORAL EVERY 4 HOURS PRN
Status: DISCONTINUED | OUTPATIENT
Start: 2025-08-26 | End: 2025-08-27 | Stop reason: HOSPADM

## 2025-08-25 RX ORDER — SODIUM CHLORIDE, SODIUM LACTATE, POTASSIUM CHLORIDE, CALCIUM CHLORIDE 600; 310; 30; 20 MG/100ML; MG/100ML; MG/100ML; MG/100ML
INJECTION, SOLUTION INTRAVENOUS CONTINUOUS PRN
Status: DISCONTINUED | OUTPATIENT
Start: 2025-08-25 | End: 2025-08-25

## 2025-08-25 RX ORDER — DEXMEDETOMIDINE HYDROCHLORIDE 100 UG/ML
INJECTION, SOLUTION INTRAVENOUS AS NEEDED
Status: DISCONTINUED | OUTPATIENT
Start: 2025-08-25 | End: 2025-08-25

## 2025-08-25 RX ORDER — HYDRALAZINE HYDROCHLORIDE 20 MG/ML
5 INJECTION INTRAMUSCULAR; INTRAVENOUS ONCE AS NEEDED
Status: DISCONTINUED | OUTPATIENT
Start: 2025-08-25 | End: 2025-08-27 | Stop reason: HOSPADM

## 2025-08-25 RX ORDER — SCOPOLAMINE 1 MG/3D
1 PATCH, EXTENDED RELEASE TRANSDERMAL
Status: DISCONTINUED | OUTPATIENT
Start: 2025-08-25 | End: 2025-08-25 | Stop reason: HOSPADM

## 2025-08-25 RX ORDER — ACETAMINOPHEN 325 MG/1
975 TABLET ORAL EVERY 6 HOURS
Status: DISCONTINUED | OUTPATIENT
Start: 2025-08-25 | End: 2025-08-27 | Stop reason: HOSPADM

## 2025-08-25 RX ORDER — TRANEXAMIC ACID 1 G/10ML
1000 INJECTION, SOLUTION INTRAVENOUS ONCE AS NEEDED
Status: DISCONTINUED | OUTPATIENT
Start: 2025-08-25 | End: 2025-08-27 | Stop reason: HOSPADM

## 2025-08-25 RX ORDER — NALOXONE HYDROCHLORIDE 0.4 MG/ML
0.1 INJECTION, SOLUTION INTRAMUSCULAR; INTRAVENOUS; SUBCUTANEOUS EVERY 5 MIN PRN
Status: DISCONTINUED | OUTPATIENT
Start: 2025-08-25 | End: 2025-08-27 | Stop reason: HOSPADM

## 2025-08-25 RX ORDER — CARBOPROST TROMETHAMINE 250 UG/ML
250 INJECTION, SOLUTION INTRAMUSCULAR ONCE AS NEEDED
Status: DISCONTINUED | OUTPATIENT
Start: 2025-08-25 | End: 2025-08-27 | Stop reason: HOSPADM

## 2025-08-25 RX ORDER — DIPHENHYDRAMINE HYDROCHLORIDE 50 MG/ML
25 INJECTION, SOLUTION INTRAMUSCULAR; INTRAVENOUS EVERY 4 HOURS PRN
Status: DISCONTINUED | OUTPATIENT
Start: 2025-08-25 | End: 2025-08-27 | Stop reason: HOSPADM

## 2025-08-25 RX ORDER — ONDANSETRON 4 MG/1
4 TABLET, FILM COATED ORAL EVERY 6 HOURS PRN
Status: DISCONTINUED | OUTPATIENT
Start: 2025-08-25 | End: 2025-08-27 | Stop reason: HOSPADM

## 2025-08-25 RX ORDER — PHENYLEPHRINE HCL IN 0.9% NACL 1 MG/10 ML
SYRINGE (ML) INTRAVENOUS AS NEEDED
Status: DISCONTINUED | OUTPATIENT
Start: 2025-08-25 | End: 2025-08-25

## 2025-08-25 RX ORDER — SIMETHICONE 80 MG
80 TABLET,CHEWABLE ORAL 4 TIMES DAILY PRN
Status: DISCONTINUED | OUTPATIENT
Start: 2025-08-25 | End: 2025-08-27 | Stop reason: HOSPADM

## 2025-08-25 RX ORDER — LOPERAMIDE HYDROCHLORIDE 2 MG/1
4 CAPSULE ORAL EVERY 2 HOUR PRN
Status: DISCONTINUED | OUTPATIENT
Start: 2025-08-25 | End: 2025-08-27 | Stop reason: HOSPADM

## 2025-08-25 RX ORDER — METHYLERGONOVINE MALEATE 0.2 MG/ML
0.2 INJECTION INTRAVENOUS ONCE AS NEEDED
Status: DISCONTINUED | OUTPATIENT
Start: 2025-08-25 | End: 2025-08-27 | Stop reason: HOSPADM

## 2025-08-25 RX ORDER — OXYTOCIN/0.9 % SODIUM CHLORIDE 30/500 ML
60 PLASTIC BAG, INJECTION (ML) INTRAVENOUS ONCE AS NEEDED
Status: DISCONTINUED | OUTPATIENT
Start: 2025-08-25 | End: 2025-08-27 | Stop reason: HOSPADM

## 2025-08-25 RX ORDER — ONDANSETRON HYDROCHLORIDE 2 MG/ML
4 INJECTION, SOLUTION INTRAVENOUS EVERY 6 HOURS PRN
Status: DISCONTINUED | OUTPATIENT
Start: 2025-08-25 | End: 2025-08-27 | Stop reason: HOSPADM

## 2025-08-25 RX ORDER — MISOPROSTOL 200 UG/1
800 TABLET ORAL ONCE AS NEEDED
Status: DISCONTINUED | OUTPATIENT
Start: 2025-08-25 | End: 2025-08-27 | Stop reason: HOSPADM

## 2025-08-25 RX ORDER — HYDROMORPHONE HYDROCHLORIDE 1 MG/ML
0.2 INJECTION, SOLUTION INTRAMUSCULAR; INTRAVENOUS; SUBCUTANEOUS EVERY 5 MIN PRN
Status: DISCONTINUED | OUTPATIENT
Start: 2025-08-25 | End: 2025-08-25 | Stop reason: HOSPADM

## 2025-08-25 RX ORDER — LABETALOL HYDROCHLORIDE 5 MG/ML
20 INJECTION, SOLUTION INTRAVENOUS ONCE AS NEEDED
Status: DISCONTINUED | OUTPATIENT
Start: 2025-08-25 | End: 2025-08-27 | Stop reason: HOSPADM

## 2025-08-25 RX ORDER — ALBUMIN HUMAN 50 G/1000ML
SOLUTION INTRAVENOUS AS NEEDED
Status: DISCONTINUED | OUTPATIENT
Start: 2025-08-25 | End: 2025-08-25

## 2025-08-25 RX ORDER — ENOXAPARIN SODIUM 100 MG/ML
40 INJECTION SUBCUTANEOUS EVERY 24 HOURS
Status: DISCONTINUED | OUTPATIENT
Start: 2025-08-26 | End: 2025-08-27 | Stop reason: HOSPADM

## 2025-08-25 RX ORDER — METHYLERGONOVINE MALEATE 0.2 MG/ML
INJECTION INTRAVENOUS AS NEEDED
Status: DISCONTINUED | OUTPATIENT
Start: 2025-08-25 | End: 2025-08-25 | Stop reason: HOSPADM

## 2025-08-25 RX ORDER — MORPHINE SULFATE 1 MG/ML
INJECTION, SOLUTION EPIDURAL; INTRATHECAL; INTRAVENOUS AS NEEDED
Status: DISCONTINUED | OUTPATIENT
Start: 2025-08-25 | End: 2025-08-25

## 2025-08-25 RX ORDER — KETOROLAC TROMETHAMINE 30 MG/ML
30 INJECTION, SOLUTION INTRAMUSCULAR; INTRAVENOUS EVERY 6 HOURS
Status: COMPLETED | OUTPATIENT
Start: 2025-08-25 | End: 2025-08-26

## 2025-08-25 RX ORDER — POLYETHYLENE GLYCOL 3350 17 G/17G
17 POWDER, FOR SOLUTION ORAL 2 TIMES DAILY PRN
Status: DISCONTINUED | OUTPATIENT
Start: 2025-08-25 | End: 2025-08-27 | Stop reason: HOSPADM

## 2025-08-25 RX ORDER — DIPHENHYDRAMINE HCL 25 MG
25 TABLET ORAL EVERY 4 HOURS PRN
Status: DISCONTINUED | OUTPATIENT
Start: 2025-08-25 | End: 2025-08-27 | Stop reason: HOSPADM

## 2025-08-25 RX ADMIN — Medication 150 MCG: at 10:18

## 2025-08-25 RX ADMIN — KETOROLAC TROMETHAMINE 30 MG: 30 INJECTION, SOLUTION INTRAMUSCULAR at 10:32

## 2025-08-25 RX ADMIN — DEXAMETHASONE SODIUM PHOSPHATE 4 MG: 4 INJECTION, SOLUTION INTRAMUSCULAR; INTRAVENOUS at 10:10

## 2025-08-25 RX ADMIN — CLINDAMYCIN PHOSPHATE 900 MG: 900 INJECTION, SOLUTION INTRAVENOUS at 15:49

## 2025-08-25 RX ADMIN — ALBUMIN HUMAN 250 ML: 0.05 INJECTION, SOLUTION INTRAVENOUS at 10:14

## 2025-08-25 RX ADMIN — FENTANYL CITRATE 100 MCG: 50 INJECTION, SOLUTION INTRAMUSCULAR; INTRAVENOUS at 09:41

## 2025-08-25 RX ADMIN — LIDOCAINE HYDROCHLORIDE,EPINEPHRINE BITARTRATE 3 ML: 20; .005 INJECTION, SOLUTION EPIDURAL; INFILTRATION; INTRACAUDAL; PERINEURAL at 09:38

## 2025-08-25 RX ADMIN — ACETAMINOPHEN 650 MG: 650 SUPPOSITORY RECTAL at 11:16

## 2025-08-25 RX ADMIN — MORPHINE SULFATE 2 MG: 1 INJECTION, SOLUTION EPIDURAL; INTRATHECAL; INTRAVENOUS at 10:19

## 2025-08-25 RX ADMIN — FAMOTIDINE 20 MG: 10 INJECTION, SOLUTION INTRAVENOUS at 09:51

## 2025-08-25 RX ADMIN — LIDOCAINE HYDROCHLORIDE,EPINEPHRINE BITARTRATE 5 ML: 20; .005 INJECTION, SOLUTION EPIDURAL; INFILTRATION; INTRACAUDAL; PERINEURAL at 09:33

## 2025-08-25 RX ADMIN — Medication 100 MCG: at 10:35

## 2025-08-25 RX ADMIN — CLINDAMYCIN IN 5 PERCENT DEXTROSE 900 MG: 18 INJECTION, SOLUTION INTRAVENOUS at 09:49

## 2025-08-25 RX ADMIN — ONDANSETRON 4 MG: 2 INJECTION, SOLUTION INTRAMUSCULAR; INTRAVENOUS at 09:51

## 2025-08-25 RX ADMIN — ALBUMIN HUMAN 250 ML: 0.05 INJECTION, SOLUTION INTRAVENOUS at 10:34

## 2025-08-25 RX ADMIN — Medication 200 MCG: at 10:27

## 2025-08-25 RX ADMIN — Medication 150 MCG: at 10:21

## 2025-08-25 RX ADMIN — Medication 100 MCG: at 10:13

## 2025-08-25 RX ADMIN — GENTAMICIN SULFATE 300 MG: 40 INJECTION, SOLUTION INTRAMUSCULAR; INTRAVENOUS at 09:55

## 2025-08-25 RX ADMIN — LIDOCAINE HYDROCHLORIDE,EPINEPHRINE BITARTRATE 2 ML: 20; .005 INJECTION, SOLUTION EPIDURAL; INFILTRATION; INTRACAUDAL; PERINEURAL at 09:42

## 2025-08-25 RX ADMIN — OXYTOCIN 600 MILLI-UNITS/MIN: 10 INJECTION, SOLUTION INTRAMUSCULAR; INTRAVENOUS at 10:05

## 2025-08-25 RX ADMIN — LIDOCAINE HYDROCHLORIDE,EPINEPHRINE BITARTRATE 3 ML: 20; .005 INJECTION, SOLUTION EPIDURAL; INFILTRATION; INTRACAUDAL; PERINEURAL at 10:30

## 2025-08-25 RX ADMIN — AZITHROMYCIN 500 MG: 500 INJECTION, POWDER, LYOPHILIZED, FOR SOLUTION INTRAVENOUS at 09:46

## 2025-08-25 RX ADMIN — DEXMEDETOMIDINE HYDROCHLORIDE 10 MCG: 100 INJECTION, SOLUTION INTRAVENOUS at 11:20

## 2025-08-25 RX ADMIN — AZITHROMYCIN MONOHYDRATE 500 MG: 500 INJECTION, POWDER, LYOPHILIZED, FOR SOLUTION INTRAVENOUS at 21:03

## 2025-08-25 RX ADMIN — Medication 100 MCG: at 10:25

## 2025-08-25 RX ADMIN — SODIUM CHLORIDE, POTASSIUM CHLORIDE, SODIUM LACTATE AND CALCIUM CHLORIDE: 600; 310; 30; 20 INJECTION, SOLUTION INTRAVENOUS at 09:32

## 2025-08-25 RX ADMIN — ONDANSETRON 4 MG: 2 INJECTION, SOLUTION INTRAMUSCULAR; INTRAVENOUS at 04:40

## 2025-08-25 RX ADMIN — SCOPOLAMINE 1 PATCH: 1.5 PATCH, EXTENDED RELEASE TRANSDERMAL at 15:54

## 2025-08-25 RX ADMIN — ACETAMINOPHEN 975 MG: 325 TABLET ORAL at 17:32

## 2025-08-25 RX ADMIN — TERBUTALINE SULFATE 0.25 MG: 1 INJECTION, SOLUTION SUBCUTANEOUS at 07:22

## 2025-08-25 RX ADMIN — KETOROLAC TROMETHAMINE 30 MG: 30 INJECTION, SOLUTION INTRAMUSCULAR at 17:32

## 2025-08-25 ASSESSMENT — PAIN SCALES - GENERAL
PAINLEVEL_OUTOF10: 8
PAINLEVEL_OUTOF10: 4
PAINLEVEL_OUTOF10: 4
PAINLEVEL_OUTOF10: 0 - NO PAIN
PAINLEVEL_OUTOF10: 8
PAINLEVEL_OUTOF10: 0 - NO PAIN
PAINLEVEL_OUTOF10: 3
PAINLEVEL_OUTOF10: 4
PAINLEVEL_OUTOF10: 0 - NO PAIN
PAINLEVEL_OUTOF10: 7
PAINLEVEL_OUTOF10: 0 - NO PAIN
PAINLEVEL_OUTOF10: 0 - NO PAIN
PAINLEVEL_OUTOF10: 3

## 2025-08-25 ASSESSMENT — PAIN DESCRIPTION - DESCRIPTORS: DESCRIPTORS: SORE

## 2025-08-26 LAB
ERYTHROCYTE [DISTWIDTH] IN BLOOD BY AUTOMATED COUNT: 15.1 % (ref 11.5–14.5)
HCT VFR BLD AUTO: 24.3 % (ref 36–46)
HGB BLD-MCNC: 8.3 G/DL (ref 12–16)
MCH RBC QN AUTO: 32 PG (ref 26–34)
MCHC RBC AUTO-ENTMCNC: 34.2 G/DL (ref 32–36)
MCV RBC AUTO: 94 FL (ref 80–100)
NRBC BLD-RTO: 0 /100 WBCS (ref 0–0)
PLATELET # BLD AUTO: 141 X10*3/UL (ref 150–450)
RBC # BLD AUTO: 2.59 X10*6/UL (ref 4–5.2)
WBC # BLD AUTO: 17.9 X10*3/UL (ref 4.4–11.3)

## 2025-08-26 PROCEDURE — 2500000004 HC RX 250 GENERAL PHARMACY W/ HCPCS (ALT 636 FOR OP/ED): Performed by: OBSTETRICS & GYNECOLOGY

## 2025-08-26 PROCEDURE — 2500000001 HC RX 250 WO HCPCS SELF ADMINISTERED DRUGS (ALT 637 FOR MEDICARE OP): Performed by: OBSTETRICS & GYNECOLOGY

## 2025-08-26 PROCEDURE — 1220000001 HC OB SEMI-PRIVATE ROOM DAILY

## 2025-08-26 PROCEDURE — 2500000005 HC RX 250 GENERAL PHARMACY W/O HCPCS: Performed by: OBSTETRICS & GYNECOLOGY

## 2025-08-26 PROCEDURE — 85027 COMPLETE CBC AUTOMATED: CPT | Performed by: OBSTETRICS & GYNECOLOGY

## 2025-08-26 PROCEDURE — 36415 COLL VENOUS BLD VENIPUNCTURE: CPT | Performed by: OBSTETRICS & GYNECOLOGY

## 2025-08-26 RX ORDER — DIPHENHYDRAMINE HYDROCHLORIDE 50 MG/ML
50 INJECTION, SOLUTION INTRAMUSCULAR; INTRAVENOUS EVERY 5 MIN PRN
Status: DISCONTINUED | OUTPATIENT
Start: 2025-08-26 | End: 2025-08-26

## 2025-08-26 RX ORDER — FERROUS SULFATE 325(65) MG
65 TABLET ORAL
Status: DISCONTINUED | OUTPATIENT
Start: 2025-08-27 | End: 2025-08-27 | Stop reason: HOSPADM

## 2025-08-26 RX ADMIN — FLUOXETINE HYDROCHLORIDE 60 MG: 20 CAPSULE ORAL at 09:13

## 2025-08-26 RX ADMIN — POLYETHYLENE GLYCOL 3350 17 G: 17 POWDER, FOR SOLUTION ORAL at 09:13

## 2025-08-26 RX ADMIN — AZITHROMYCIN MONOHYDRATE 500 MG: 500 INJECTION, POWDER, LYOPHILIZED, FOR SOLUTION INTRAVENOUS at 21:01

## 2025-08-26 RX ADMIN — ACETAMINOPHEN 975 MG: 325 TABLET ORAL at 06:14

## 2025-08-26 RX ADMIN — ACETAMINOPHEN 975 MG: 325 TABLET ORAL at 23:54

## 2025-08-26 RX ADMIN — SIMETHICONE 80 MG: 80 TABLET, CHEWABLE ORAL at 22:15

## 2025-08-26 RX ADMIN — DEXTROAMPHETAMINE SACCHARATE, AMPHETAMINE ASPARTATE, DEXTROAMPHETAMINE SULFATE AND AMPHETAMINE SULFATE 5 MG: 2.5; 2.5; 2.5; 2.5 TABLET ORAL at 09:54

## 2025-08-26 RX ADMIN — ACETAMINOPHEN 975 MG: 325 TABLET ORAL at 00:14

## 2025-08-26 RX ADMIN — ACETAMINOPHEN 975 MG: 325 TABLET ORAL at 12:15

## 2025-08-26 RX ADMIN — OXYCODONE HYDROCHLORIDE 5 MG: 5 TABLET ORAL at 15:18

## 2025-08-26 RX ADMIN — IBUPROFEN 600 MG: 600 TABLET ORAL at 12:15

## 2025-08-26 RX ADMIN — DEXTROAMPHETAMINE SACCHARATE, AMPHETAMINE ASPARTATE, DEXTROAMPHETAMINE SULFATE AND AMPHETAMINE SULFATE 5 MG: 2.5; 2.5; 2.5; 2.5 TABLET ORAL at 15:16

## 2025-08-26 RX ADMIN — IBUPROFEN 600 MG: 600 TABLET ORAL at 23:54

## 2025-08-26 RX ADMIN — OXYCODONE HYDROCHLORIDE 5 MG: 5 TABLET ORAL at 21:09

## 2025-08-26 RX ADMIN — LIDOCAINE 4% 1 PATCH: 40 PATCH TOPICAL at 16:19

## 2025-08-26 RX ADMIN — IBUPROFEN 600 MG: 600 TABLET ORAL at 18:19

## 2025-08-26 RX ADMIN — KETOROLAC TROMETHAMINE 30 MG: 30 INJECTION, SOLUTION INTRAMUSCULAR at 00:14

## 2025-08-26 RX ADMIN — ENOXAPARIN SODIUM 40 MG: 40 INJECTION SUBCUTANEOUS at 09:13

## 2025-08-26 RX ADMIN — KETOROLAC TROMETHAMINE 30 MG: 30 INJECTION, SOLUTION INTRAMUSCULAR at 06:14

## 2025-08-26 RX ADMIN — ACETAMINOPHEN 975 MG: 325 TABLET ORAL at 18:19

## 2025-08-26 ASSESSMENT — PAIN SCALES - GENERAL
PAINLEVEL_OUTOF10: 7
PAINLEVEL_OUTOF10: 3
PAINLEVEL_OUTOF10: 3
PAINLEVEL_OUTOF10: 5 - MODERATE PAIN
PAINLEVEL_OUTOF10: 8
PAINLEVEL_OUTOF10: 5 - MODERATE PAIN
PAINLEVEL_OUTOF10: 8

## 2025-08-26 ASSESSMENT — PAIN - FUNCTIONAL ASSESSMENT
PAIN_FUNCTIONAL_ASSESSMENT: 0-10
PAIN_FUNCTIONAL_ASSESSMENT: 0-10

## 2025-08-26 ASSESSMENT — PAIN DESCRIPTION - LOCATION
LOCATION: ABDOMEN
LOCATION: ABDOMEN

## 2025-08-26 ASSESSMENT — PAIN DESCRIPTION - DESCRIPTORS: DESCRIPTORS: SORE

## 2025-08-27 ENCOUNTER — PHARMACY VISIT (OUTPATIENT)
Dept: PHARMACY | Facility: CLINIC | Age: 35
End: 2025-08-27
Payer: COMMERCIAL

## 2025-08-27 VITALS
OXYGEN SATURATION: 96 % | HEART RATE: 62 BPM | TEMPERATURE: 98.6 F | RESPIRATION RATE: 16 BRPM | DIASTOLIC BLOOD PRESSURE: 54 MMHG | HEIGHT: 63 IN | BODY MASS INDEX: 28.12 KG/M2 | WEIGHT: 158.73 LBS | SYSTOLIC BLOOD PRESSURE: 112 MMHG

## 2025-08-27 PROCEDURE — 2500000004 HC RX 250 GENERAL PHARMACY W/ HCPCS (ALT 636 FOR OP/ED): Performed by: OBSTETRICS & GYNECOLOGY

## 2025-08-27 PROCEDURE — 2500000001 HC RX 250 WO HCPCS SELF ADMINISTERED DRUGS (ALT 637 FOR MEDICARE OP): Performed by: OBSTETRICS & GYNECOLOGY

## 2025-08-27 PROCEDURE — RXMED WILLOW AMBULATORY MEDICATION CHARGE

## 2025-08-27 RX ORDER — ACETAMINOPHEN 500 MG
1000 TABLET ORAL EVERY 6 HOURS PRN
COMMUNITY
Start: 2025-08-27

## 2025-08-27 RX ORDER — IBUPROFEN 200 MG
600 TABLET ORAL EVERY 6 HOURS PRN
COMMUNITY
Start: 2025-08-27

## 2025-08-27 RX ORDER — FERROUS SULFATE 325(65) MG
325 TABLET, DELAYED RELEASE (ENTERIC COATED) ORAL
Qty: 30 TABLET | Refills: 3 | Status: SHIPPED | OUTPATIENT
Start: 2025-08-27

## 2025-08-27 RX ORDER — OXYCODONE HYDROCHLORIDE 5 MG/1
5 TABLET ORAL EVERY 6 HOURS PRN
Qty: 8 TABLET | Refills: 0 | Status: SHIPPED | OUTPATIENT
Start: 2025-08-27 | End: 2025-09-03

## 2025-08-27 RX ADMIN — ACETAMINOPHEN 975 MG: 325 TABLET ORAL at 12:25

## 2025-08-27 RX ADMIN — ACETAMINOPHEN 975 MG: 325 TABLET ORAL at 05:58

## 2025-08-27 RX ADMIN — IBUPROFEN 600 MG: 600 TABLET ORAL at 05:58

## 2025-08-27 RX ADMIN — FERROUS SULFATE TAB 325 MG (65 MG ELEMENTAL FE) 1 TABLET: 325 (65 FE) TAB at 09:39

## 2025-08-27 RX ADMIN — FLUOXETINE HYDROCHLORIDE 60 MG: 20 CAPSULE ORAL at 09:38

## 2025-08-27 RX ADMIN — ENOXAPARIN SODIUM 40 MG: 40 INJECTION SUBCUTANEOUS at 09:38

## 2025-08-27 RX ADMIN — IBUPROFEN 600 MG: 600 TABLET ORAL at 12:25

## 2025-08-27 RX ADMIN — DEXTROAMPHETAMINE SACCHARATE, AMPHETAMINE ASPARTATE, DEXTROAMPHETAMINE SULFATE AND AMPHETAMINE SULFATE 5 MG: 2.5; 2.5; 2.5; 2.5 TABLET ORAL at 09:38

## 2025-08-27 ASSESSMENT — PAIN SCALES - GENERAL
PAINLEVEL_OUTOF10: 1
PAINLEVEL_OUTOF10: 2
PAINLEVEL_OUTOF10: 6

## 2025-08-27 ASSESSMENT — PAIN - FUNCTIONAL ASSESSMENT: PAIN_FUNCTIONAL_ASSESSMENT: 0-10

## 2025-08-27 ASSESSMENT — PAIN DESCRIPTION - LOCATION: LOCATION: ABDOMEN

## 2025-08-28 ENCOUNTER — APPOINTMENT (OUTPATIENT)
Facility: CLINIC | Age: 35
End: 2025-08-28
Payer: COMMERCIAL

## 2025-08-28 LAB
BLOOD EXPIRATION DATE: NORMAL
BLOOD EXPIRATION DATE: NORMAL
DISPENSE STATUS: NORMAL
DISPENSE STATUS: NORMAL
LABORATORY COMMENT REPORT: NORMAL
PATH REPORT.FINAL DX SPEC: NORMAL
PATH REPORT.GROSS SPEC: NORMAL
PATH REPORT.RELEVANT HX SPEC: NORMAL
PATH REPORT.TOTAL CANCER: NORMAL
PRODUCT BLOOD TYPE: 9500
PRODUCT BLOOD TYPE: 9500
PRODUCT CODE: NORMAL
PRODUCT CODE: NORMAL
UNIT ABO: NORMAL
UNIT ABO: NORMAL
UNIT NUMBER: NORMAL
UNIT NUMBER: NORMAL
UNIT RH: NORMAL
UNIT RH: NORMAL
UNIT VOLUME: 350
UNIT VOLUME: 350
XM INTEP: NORMAL
XM INTEP: NORMAL

## 2025-09-03 ENCOUNTER — APPOINTMENT (OUTPATIENT)
Dept: OBSTETRICS AND GYNECOLOGY | Facility: CLINIC | Age: 35
End: 2025-09-03
Payer: COMMERCIAL

## 2025-09-03 PROBLEM — D62 ACUTE ON CHRONIC BLOOD LOSS ANEMIA: Status: ACTIVE | Noted: 2025-09-03

## 2025-09-03 PROBLEM — B00.1 HERPES LABIALIS: Status: ACTIVE | Noted: 2025-09-03

## 2025-09-03 PROBLEM — Z23 ENCOUNTER FOR VACCINATION: Status: ACTIVE | Noted: 2025-09-03

## 2025-09-03 ASSESSMENT — EDINBURGH POSTNATAL DEPRESSION SCALE (EPDS)
I HAVE BLAMED MYSELF UNNECESSARILY WHEN THINGS WENT WRONG: NOT VERY OFTEN
I HAVE BEEN ANXIOUS OR WORRIED FOR NO GOOD REASON: HARDLY EVER
TOTAL SCORE: 4
I HAVE FELT SCARED OR PANICKY FOR NO GOOD REASON: NO, NOT AT ALL
THINGS HAVE BEEN GETTING ON TOP OF ME: NO, MOST OF THE TIME I HAVE COPED QUITE WELL
I HAVE BEEN ABLE TO LAUGH AND SEE THE FUNNY SIDE OF THINGS: AS MUCH AS I ALWAYS COULD
THE THOUGHT OF HARMING MYSELF HAS OCCURRED TO ME: NEVER
I HAVE FELT SAD OR MISERABLE: NO, NOT AT ALL
I HAVE BEEN SO UNHAPPY THAT I HAVE BEEN CRYING: ONLY OCCASIONALLY
I HAVE LOOKED FORWARD WITH ENJOYMENT TO THINGS: AS MUCH AS I EVER DID
I HAVE BEEN SO UNHAPPY THAT I HAVE HAD DIFFICULTY SLEEPING: NOT AT ALL

## 2025-09-04 ENCOUNTER — APPOINTMENT (OUTPATIENT)
Facility: CLINIC | Age: 35
End: 2025-09-04
Payer: COMMERCIAL

## (undated) DEVICE — Device